# Patient Record
Sex: MALE | Race: BLACK OR AFRICAN AMERICAN | NOT HISPANIC OR LATINO | Employment: FULL TIME | URBAN - METROPOLITAN AREA
[De-identification: names, ages, dates, MRNs, and addresses within clinical notes are randomized per-mention and may not be internally consistent; named-entity substitution may affect disease eponyms.]

---

## 2018-05-03 ENCOUNTER — COMMUNICATION - HEALTHEAST (OUTPATIENT)
Dept: FAMILY MEDICINE | Facility: CLINIC | Age: 43
End: 2018-05-03

## 2018-05-03 ENCOUNTER — OFFICE VISIT - HEALTHEAST (OUTPATIENT)
Dept: FAMILY MEDICINE | Facility: CLINIC | Age: 43
End: 2018-05-03

## 2018-05-03 DIAGNOSIS — I10 ESSENTIAL HYPERTENSION: ICD-10-CM

## 2018-05-03 DIAGNOSIS — M54.6 ACUTE RIGHT-SIDED THORACIC BACK PAIN: ICD-10-CM

## 2018-05-03 DIAGNOSIS — Z00.00 HEALTHCARE MAINTENANCE: ICD-10-CM

## 2018-05-03 DIAGNOSIS — M25.551 RIGHT HIP PAIN: ICD-10-CM

## 2018-05-03 LAB
ALBUMIN SERPL-MCNC: 3.9 G/DL (ref 3.5–5)
ALP SERPL-CCNC: 78 U/L (ref 45–120)
ALT SERPL W P-5'-P-CCNC: 13 U/L (ref 0–45)
ANION GAP SERPL CALCULATED.3IONS-SCNC: 6 MMOL/L (ref 5–18)
AST SERPL W P-5'-P-CCNC: 15 U/L (ref 0–40)
BILIRUB SERPL-MCNC: 0.3 MG/DL (ref 0–1)
BUN SERPL-MCNC: 12 MG/DL (ref 8–22)
CALCIUM SERPL-MCNC: 9.8 MG/DL (ref 8.5–10.5)
CHLORIDE BLD-SCNC: 105 MMOL/L (ref 98–107)
CHOLEST SERPL-MCNC: 213 MG/DL
CO2 SERPL-SCNC: 28 MMOL/L (ref 22–31)
CREAT SERPL-MCNC: 0.96 MG/DL (ref 0.7–1.3)
FASTING STATUS PATIENT QL REPORTED: YES
GFR SERPL CREATININE-BSD FRML MDRD: >60 ML/MIN/1.73M2
GLUCOSE BLD-MCNC: 90 MG/DL (ref 70–125)
HDLC SERPL-MCNC: 45 MG/DL
LDLC SERPL CALC-MCNC: 143 MG/DL
POTASSIUM BLD-SCNC: 4.2 MMOL/L (ref 3.5–5)
PROT SERPL-MCNC: 7.7 G/DL (ref 6–8)
SODIUM SERPL-SCNC: 139 MMOL/L (ref 136–145)
TRIGL SERPL-MCNC: 123 MG/DL

## 2018-05-03 ASSESSMENT — MIFFLIN-ST. JEOR: SCORE: 2207.14

## 2018-05-14 ENCOUNTER — COMMUNICATION - HEALTHEAST (OUTPATIENT)
Dept: FAMILY MEDICINE | Facility: CLINIC | Age: 43
End: 2018-05-14

## 2018-05-16 ENCOUNTER — OFFICE VISIT - HEALTHEAST (OUTPATIENT)
Dept: FAMILY MEDICINE | Facility: CLINIC | Age: 43
End: 2018-05-16

## 2018-05-16 DIAGNOSIS — I10 ESSENTIAL HYPERTENSION: ICD-10-CM

## 2018-05-16 DIAGNOSIS — M54.50 ACUTE RIGHT-SIDED LOW BACK PAIN WITHOUT SCIATICA: ICD-10-CM

## 2018-05-16 ASSESSMENT — MIFFLIN-ST. JEOR: SCORE: 2202.61

## 2018-05-30 ENCOUNTER — COMMUNICATION - HEALTHEAST (OUTPATIENT)
Dept: FAMILY MEDICINE | Facility: CLINIC | Age: 43
End: 2018-05-30

## 2018-05-30 DIAGNOSIS — I10 ESSENTIAL HYPERTENSION: ICD-10-CM

## 2018-07-23 ENCOUNTER — RECORDS - HEALTHEAST (OUTPATIENT)
Dept: GENERAL RADIOLOGY | Facility: CLINIC | Age: 43
End: 2018-07-23

## 2018-07-23 ENCOUNTER — OFFICE VISIT - HEALTHEAST (OUTPATIENT)
Dept: FAMILY MEDICINE | Facility: CLINIC | Age: 43
End: 2018-07-23

## 2018-07-23 DIAGNOSIS — M54.6 PAIN IN THORACIC SPINE: ICD-10-CM

## 2018-07-23 DIAGNOSIS — G89.29 OTHER CHRONIC PAIN: ICD-10-CM

## 2018-07-23 DIAGNOSIS — G89.29 CHRONIC MIDLINE THORACIC BACK PAIN: ICD-10-CM

## 2018-07-23 DIAGNOSIS — M54.6 CHRONIC MIDLINE THORACIC BACK PAIN: ICD-10-CM

## 2018-07-23 ASSESSMENT — MIFFLIN-ST. JEOR: SCORE: 2189

## 2018-07-25 ENCOUNTER — COMMUNICATION - HEALTHEAST (OUTPATIENT)
Dept: FAMILY MEDICINE | Facility: CLINIC | Age: 43
End: 2018-07-25

## 2019-06-08 ENCOUNTER — COMMUNICATION - HEALTHEAST (OUTPATIENT)
Dept: FAMILY MEDICINE | Facility: CLINIC | Age: 44
End: 2019-06-08

## 2019-06-08 DIAGNOSIS — I10 ESSENTIAL HYPERTENSION: ICD-10-CM

## 2019-10-16 ENCOUNTER — COMMUNICATION - HEALTHEAST (OUTPATIENT)
Dept: FAMILY MEDICINE | Facility: CLINIC | Age: 44
End: 2019-10-16

## 2019-10-16 DIAGNOSIS — I10 ESSENTIAL HYPERTENSION: ICD-10-CM

## 2020-04-22 ENCOUNTER — COMMUNICATION - HEALTHEAST (OUTPATIENT)
Dept: FAMILY MEDICINE | Facility: CLINIC | Age: 45
End: 2020-04-22

## 2020-04-27 ENCOUNTER — COMMUNICATION - HEALTHEAST (OUTPATIENT)
Dept: FAMILY MEDICINE | Facility: CLINIC | Age: 45
End: 2020-04-27

## 2020-04-27 ENCOUNTER — AMBULATORY - HEALTHEAST (OUTPATIENT)
Dept: FAMILY MEDICINE | Facility: CLINIC | Age: 45
End: 2020-04-27

## 2020-04-27 ENCOUNTER — OFFICE VISIT - HEALTHEAST (OUTPATIENT)
Dept: FAMILY MEDICINE | Facility: CLINIC | Age: 45
End: 2020-04-27

## 2020-04-27 DIAGNOSIS — R07.89 CHEST WALL PAIN: ICD-10-CM

## 2020-04-27 DIAGNOSIS — M54.6 CHRONIC RIGHT-SIDED THORACIC BACK PAIN: ICD-10-CM

## 2020-04-27 DIAGNOSIS — R53.83 FATIGUE, UNSPECIFIED TYPE: ICD-10-CM

## 2020-04-27 DIAGNOSIS — G89.29 CHRONIC RIGHT-SIDED THORACIC BACK PAIN: ICD-10-CM

## 2020-04-27 DIAGNOSIS — R06.83 SNORING: ICD-10-CM

## 2020-04-27 RX ORDER — IBUPROFEN 200 MG
200 TABLET ORAL EVERY 6 HOURS PRN
Status: SHIPPED | COMMUNITY
Start: 2020-04-27

## 2020-04-29 ENCOUNTER — COMMUNICATION - HEALTHEAST (OUTPATIENT)
Dept: FAMILY MEDICINE | Facility: CLINIC | Age: 45
End: 2020-04-29

## 2020-05-15 ENCOUNTER — HOSPITAL ENCOUNTER (OUTPATIENT)
Dept: PHYSICAL MEDICINE AND REHAB | Facility: CLINIC | Age: 45
Discharge: HOME | End: 2020-05-15
Attending: PAIN MEDICINE

## 2020-05-15 DIAGNOSIS — M54.6 ACUTE LEFT-SIDED THORACIC BACK PAIN: ICD-10-CM

## 2020-05-15 DIAGNOSIS — M79.18 MYOFASCIAL PAIN: ICD-10-CM

## 2020-05-26 ENCOUNTER — OFFICE VISIT - HEALTHEAST (OUTPATIENT)
Dept: PHYSICAL THERAPY | Facility: REHABILITATION | Age: 45
End: 2020-05-26

## 2020-05-26 DIAGNOSIS — R29.3 POOR POSTURE: ICD-10-CM

## 2020-05-26 DIAGNOSIS — M62.81 MUSCLE WEAKNESS (GENERALIZED): ICD-10-CM

## 2020-05-26 DIAGNOSIS — M54.6 PAIN IN THORACIC SPINE: ICD-10-CM

## 2020-06-01 ENCOUNTER — OFFICE VISIT - HEALTHEAST (OUTPATIENT)
Dept: PHYSICAL THERAPY | Facility: REHABILITATION | Age: 45
End: 2020-06-01

## 2020-06-01 DIAGNOSIS — M54.6 PAIN IN THORACIC SPINE: ICD-10-CM

## 2020-06-01 DIAGNOSIS — M62.81 MUSCLE WEAKNESS (GENERALIZED): ICD-10-CM

## 2020-06-01 DIAGNOSIS — R29.3 POOR POSTURE: ICD-10-CM

## 2020-06-15 ENCOUNTER — HOSPITAL ENCOUNTER (OUTPATIENT)
Dept: PHYSICAL MEDICINE AND REHAB | Facility: CLINIC | Age: 45
Discharge: HOME | End: 2020-06-15
Attending: PAIN MEDICINE

## 2020-06-15 DIAGNOSIS — M79.18 MYOFASCIAL PAIN: ICD-10-CM

## 2020-06-15 DIAGNOSIS — M54.6 ACUTE RIGHT-SIDED THORACIC BACK PAIN: ICD-10-CM

## 2020-12-08 ENCOUNTER — COMMUNICATION - HEALTHEAST (OUTPATIENT)
Dept: FAMILY MEDICINE | Facility: CLINIC | Age: 45
End: 2020-12-08

## 2020-12-08 DIAGNOSIS — I10 ESSENTIAL HYPERTENSION: ICD-10-CM

## 2021-04-15 ENCOUNTER — OFFICE VISIT - HEALTHEAST (OUTPATIENT)
Dept: FAMILY MEDICINE | Facility: CLINIC | Age: 46
End: 2021-04-15

## 2021-04-15 DIAGNOSIS — I10 ESSENTIAL HYPERTENSION: ICD-10-CM

## 2021-04-15 RX ORDER — LISINOPRIL 10 MG/1
10 TABLET ORAL DAILY
Qty: 90 TABLET | Refills: 3 | Status: SHIPPED | OUTPATIENT
Start: 2021-04-15 | End: 2022-05-02

## 2021-05-25 ENCOUNTER — RECORDS - HEALTHEAST (OUTPATIENT)
Dept: ADMINISTRATIVE | Facility: CLINIC | Age: 46
End: 2021-05-25

## 2021-06-01 VITALS — HEIGHT: 72 IN | BODY MASS INDEX: 38.06 KG/M2 | WEIGHT: 281 LBS

## 2021-06-01 VITALS — BODY MASS INDEX: 37.65 KG/M2 | WEIGHT: 278 LBS | HEIGHT: 72 IN

## 2021-06-01 VITALS — WEIGHT: 282 LBS | HEIGHT: 72 IN | BODY MASS INDEX: 38.19 KG/M2

## 2021-06-02 NOTE — TELEPHONE ENCOUNTER
RN cannot approve Refill Request    RN can NOT refill this medication Protocol failed and NO refill given.         Edel Arauz, [facility] Triage/Med Refill 10/17/2019    Requested Prescriptions   Pending Prescriptions Disp Refills     amLODIPine (NORVASC) 10 MG tablet [Pharmacy Med Name: AMLODIPINE BESYLATE 10MG TABLETS] 90 tablet 3     Sig: TAKE 1 TABLET(10 MG) BY MOUTH DAILY       Calcium-Channel Blockers Protocol Failed - 10/16/2019  5:11 PM        Failed - PCP or prescribing provider visit in past 12 months or next 3 months     Last office visit with prescriber/PCP: 7/23/2018 Birgit Trejo MD OR same dept: Visit date not found OR same specialty: 7/23/2018 Birgit Trejo MD  Last physical: Visit date not found Last MTM visit: Visit date not found   Next visit within 3 mo: Visit date not found  Next physical within 3 mo: Visit date not found  Prescriber OR PCP: Birgit Trejo MD  Last diagnosis associated with med order: 1. Essential hypertension  - amLODIPine (NORVASC) 10 MG tablet [Pharmacy Med Name: AMLODIPINE BESYLATE 10MG TABLETS]; TAKE 1 TABLET(10 MG) BY MOUTH DAILY  Dispense: 90 tablet; Refill: 0    If protocol passes may refill for 12 months if within 3 months of last provider visit (or a total of 15 months).             Failed - Blood pressure filed in past 12 months     BP Readings from Last 1 Encounters:   07/23/18 110/82

## 2021-06-04 VITALS — DIASTOLIC BLOOD PRESSURE: 82 MMHG | SYSTOLIC BLOOD PRESSURE: 128 MMHG | BODY MASS INDEX: 37.97 KG/M2 | WEIGHT: 280 LBS

## 2021-06-05 VITALS
BODY MASS INDEX: 38.92 KG/M2 | WEIGHT: 287 LBS | OXYGEN SATURATION: 98 % | SYSTOLIC BLOOD PRESSURE: 128 MMHG | DIASTOLIC BLOOD PRESSURE: 94 MMHG | HEART RATE: 76 BPM

## 2021-06-07 NOTE — PROGRESS NOTES
Patient ID: Juanjo Espinoza is a 44 y.o. male.  /82   Wt (!) 280 lb (127 kg)   BMI 37.97 kg/m      Assessment/Plan:                Diagnoses and all orders for this visit:    Chronic right-sided thoracic back pain    Chest wall pain  -     XR Chest 2 Views  -     Ambulatory referral to Spine Care  -     predniSONE (DELTASONE) 20 MG tablet; Take 20 mg by mouth daily.  Dispense: 7 tablet; Refill: 0    Snoring  -     Ambulatory referral to Sleep Medicine    Fatigue, unspecified type  -     Ambulatory referral to Sleep Medicine      DISCUSSION  The exact etiology of his symptoms is not clear.  It is less likely to be an underlying malignant process such as tumor infection or other similar consideration.  Likely possibilities would include muscle injury, rib injury or neuropathic pain.    Try course of more potent anti-inflammatories with prednisone.    Referral to spine clinic for additional evaluation and to formulate further treatment plan.    He is to be cautious at work but did not feel a work restriction was imperative at this point in time but may become necessary at some point.    Referral to sleep specialist for fatigue with high probability of underlying sleep apnea.  Subjective:     HPI    Juanjo Espinoza is a 44 y.o. male is here today to discuss pain under the right shoulder blade.  Patient reports the pain is been present for approximately 1 year.  Over the past week the pain has gotten significantly worse.  There is no history of any injury.  Review of chart indicates as far back as 2018 patient may have been dealing with pain in similar area.  He had an x-ray performed at that time which was unrevealing.  Patient states normally he is able to take ibuprofen and it helps with the pain but lately he has not.  He had a phone visit arranged last week but he had to cancel it because of uncertainties about insurance coverage.  Patient states he accessed a telephone consultation service and was  prescribed prednisone.  No record is available at this visit and patient states it is uncertain what the exact diagnosis was.  Patient states that he did not fill the prednisone.  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  Patient reports exacerbation of pain at his workplace doing certain tasks.  Patient reports specific movements tend to exacerbate pain.  Sometimes sitting too long can exacerbate pain.  Pain can be quite severe and sometimes lasts the whole day.  Pain is not worsened by cough sneeze or deep inspiration.  He reports no breathing difficulty.  In addition to these described pain symptoms he does report ongoing fatigue.  Upon further inquiry he does snore and is actually has had witnessed apneic spells while sleeping.  He seems to have chronic progressive fatigue.  He has never been evaluated for sleep apnea.  He otherwise denies changes in appetite or unexpected changes in his weight.  He denies chest pain shortness of breath or other signs of illness including fever.    Review of Systems  Complete review of systems is obtained.  Other than the specific considerations noted above complete review of systems is negative.      Objective:   Medications:  Current Outpatient Medications   Medication Sig     amLODIPine (NORVASC) 10 MG tablet TAKE 1 TABLET(10 MG) BY MOUTH DAILY     ibuprofen (ADVIL,MOTRIN) 200 MG tablet Take 200 mg by mouth every 6 (six) hours as needed for pain.     predniSONE (DELTASONE) 20 MG tablet Take 20 mg by mouth daily.     Allergies:  No Known Allergies    Tobacco:   reports that he has been smoking. He has been smoking about 0.50 packs per day. He has never used smokeless tobacco.     Physical Exam      /82   Wt (!) 280 lb (127 kg)   BMI 37.97 kg/m      General: Patient alert no signs of distress    Lungs: Good air movement throughout no wheeze or crackle, no exacerbation of pain with deep inspiration.    Heart: Regular rate and rhythm no  murmur    Examination of his back and chest wall reveals no bruising redness swelling or bony abnormalities are otherwise structural abnormalities that are visual in nature.  Palpation of the area as described as being greater source of pain in the chest wall just inferior to the lower border of the scapula does not elicit any significant pain.  Patient does report pain with specific movements of the arm especially with cross body abduction.  No difficulty with movement at the trunk.  Twisting movements of the trunk do cause worsening pain when going from a rotated position back to the midline.  Patient also reports doing this maneuver he has pain that also occurs near the right side sternal border at about the same level as his pain in the back.  No skin rashes seen anywhere on the chest.    CXR: The chest x-ray does not show any pathology in the lungs.  I am unable to see any distinct abnormality regarding any of the ribs or the visualized area of the spine that would in any way be related to his symptoms.

## 2021-06-07 NOTE — TELEPHONE ENCOUNTER
Called patient to start phone visit with   Dr oBb, patient was unaware that he would be charged for this visit, he asked multiple questions including what his exact cost would be. Patient decided to cancel apt with us and was going to call a different place that he has called in the past that charges a flat fee of $40. 00 for phone visits, if they are unable to help him he will call his insurance to see what his coverage will be and call us back to reschedule.

## 2021-06-07 NOTE — TELEPHONE ENCOUNTER
New Appointment Needed  What is the reason for the visit:    Office visit, patient would like to be seen in clinic. He already completed an online visit, per patient. His back pain is not improving & he will need paperwork filled out for his work.  Provider Preference: Any available  How soon do you need to be seen?: today or tomorrow  Waitlist offered?: No  Okay to leave a detailed message:  Yes

## 2021-06-07 NOTE — TELEPHONE ENCOUNTER
Finally got Eliezer.    He missed the following days: April 20th thru May 1rst.    He has not been working any reduced hours.    He does not need to work any reduced hours in the future as of now.    He was wondering if he can pick this up on Thursday.     I said I can have it up there by then.

## 2021-06-07 NOTE — TELEPHONE ENCOUNTER
Forms Request  Name of form/paperwork: [facility]  Have you been seen for this request: N/A  Do we have the form: Yes- Dropped off in clinic 4/28/2020  When is form needed by: No later than 5/1/2020  How would you like the form returned:   Patient Notified form requests are processed in 3-5 business days: No  Okay to leave a detailed message? Yes  [phone]    FYI: Patient stated that he dropped off [facility] paperwork yesterday in clinic. Patient stated that he would like a call back to confirm Dr. Sutherland has the paperwork.

## 2021-06-07 NOTE — TELEPHONE ENCOUNTER
Called  LM    Dr tony would like to chat with him about his forms.    If he calls back can we add him to DR Tony's scheduled today/Thursday at 4pm for a telephone visit?    If that doesn't work, we can scheduled him as a telephone visit tmw to complete the form.    Thank you

## 2021-06-08 NOTE — PROGRESS NOTES
"Juanjo Espinoza is a 45 y.o. male who is being evaluated via a billable video visit.      The patient has been notified of following:     \"This video visit will be conducted via a call between you and your physician/provider. We have found that certain health care needs can be provided without the need for an in-person physical exam.  This service lets us provide the care you need with a video conversation.  If a prescription is necessary we can send it directly to your pharmacy.  If lab work is needed we can place an order for that and you can then stop by our lab to have the test done at a later time.    Video visits are billed at different rates depending on your insurance coverage. Please reach out to your insurance provider with any questions.    If during the course of the call the physician/provider feels a video visit is not appropriate, you will not be charged for this service.\"    Patient has given verbal consent to a Video visit? Yes    Patient would like to receive their AVS by AVS Preference: Malena.    Patient would like the video invitation sent by: Text to cell phone: [phone]    Will anyone else be joining your video visit? No        Video Start Time: 14:10    Additional provider notes:   ASSESSMENT: Juanjo Espinoza is a 45 y.o. male presents for consultation at the request of HE PCP Alecia Ceja MD, with past medical history significant for psoriasis, hypertension, acute right-sided low back pain without sciatica, who presents today for new patient evaluation of right-sided thoracic pain and rib cage area pain:     -On video visit physical exam pain is in the area of the thoracic spine wrapping around the rib cage area on the right and to the chest wall.  It appears that he has full strength in upper and lower extremities.  His pain is likely myofascial in nature, however does have some radicular type features.    Patient is neurologically intact on exam. No myelopathic or red flag " symptoms.    CARLITO Score: 36    WHO 5: 16     Diagnoses and all orders for this visit:    Acute left-sided thoracic back pain  -     meloxicam (MOBIC) 15 MG tablet; Take 1 tablet (15 mg total) by mouth daily.  Dispense: 30 tablet; Refill: 1  -     gabapentin (NEURONTIN) 300 MG capsule; Take 1 capsule (300 mg total) by mouth 3 (three) times a day. Increase to 3 tablets three times a day as instructed.  Dispense: 270 capsule; Refill: 1  -     Ambulatory referral to Physical Therapy    Myofascial pain  -     meloxicam (MOBIC) 15 MG tablet; Take 1 tablet (15 mg total) by mouth daily.  Dispense: 30 tablet; Refill: 1  -     Ambulatory referral to Physical Therapy       PLAN:  Reviewed spine anatomy and disease process. Discussed diagnosis and treatment options with the patient today. A shared decision making model was used. The patient's values and choices were respected. The following represents what was discussed and decided upon by the provider and the patient.     -DIAGNOSTIC TESTS:  Images were personally reviewed and interpreted and explained to patient today using spine model.   --X-ray of the thoracic spine dated 7/23/2018 is personally viewed and images and interpreted it does show mild degenerative interspace narrowing and endplate spurring.  --We will consider MRI of thoracic spine should pain continue despite conservative management.    -PHYSICAL THERAPY: Ordered physical therapy and would like him to have a home-going exercise program that he can do on a daily basis.  Discussed the importance of core strengthening, ROM, stretching exercises with the patient and how each of these entities is important in decreasing pain.  Explained to the patient that the purpose of physical therapy is to teach the patient a home exercise program.  These exercises need to be performed every day in order to decrease pain and prevent future occurrences of pain.  Likened it to brushing one's teeth.      -MEDICATIONS: I have ordered  gabapentin 300 mg and given him a chart of how I want him to increase this medication.  I have also written for meloxicam 15 mg that he can either take once daily or split in half and take twice daily with food.  He should not take ibuprofen with this.  He should wait until he is done with his prednisone before starting this which will be in a few days.  -  Discussed multiple medication options today with patient. Discussed risks, side effects, and proper use of medications. Patient verbalized understanding.    -INTERVENTIONS: No interventions at this time.  Discussed risks and benefits of injections with patient today.    -PATIENT EDUCATION: We discussed pain management in a multiple fashion including physical therapy, location management, possible future imaging and/or injections.    -FOLLOW-UP:   Patient will follow-up in 4 weeks via video visit.    Advised patient to call the [facility] if symptoms worsen or you have problems controlling bladder and bowel function.   ______________________________________________________________________    SUBJECTIVE:   Juanjo Espinoza  is a 45 y.o. male who presents today for new patient evaluation of right-sided mid thoracic pain wrapping around to the anterior chest wall and some paresthesias in his right shoulder area.  Patient reports that for the last 4 weeks pain has been very severe especially when he is laying on the right side.  Pain is somewhat improved with 50 mg of prednisone that he was prescribed.  He is also been prescribed 20 mg of prednisone which was not helpful after having stopped it.  He is never had physical therapy for this.  In the past he has had this pain and had a fairly normal x-ray in 2018.  He is never tried heat or ice for this.  His pain today is 9/10.  His pain is worse at night and when he is trying to get out of bed.  Pain is especially worse if he lays on his right side.  Pain is improved with getting up and moving around as well as  medications as described above.  He denies any bowel or bladder changes, fevers, chills, unintentional weight loss.    -Treatment to Date: X-ray of the thoracic spine dated 7/23/2018.  X-ray of the chest dated 4/27/2020.    -Medications:    Current Outpatient Medications on File Prior to Encounter   Medication Sig Dispense Refill     amLODIPine (NORVASC) 10 MG tablet TAKE 1 TABLET(10 MG) BY MOUTH DAILY 90 tablet 3     ibuprofen (ADVIL,MOTRIN) 200 MG tablet Take 200 mg by mouth every 6 (six) hours as needed for pain.       predniSONE (DELTASONE) 20 MG tablet Take 20 mg by mouth daily. 7 tablet 0     No current facility-administered medications on file prior to encounter.        No Known Allergies    Past medical history hypertension, hyperlipidemia, serious infection as a child, acid reflux    Patient Active Problem List   Diagnosis     Psoriasis     Essential hypertension     Acute right-sided low back pain without sciatica       Past Surgical History:   Procedure Laterality Date     HI APPENDECTOMY      Description: Appendectomy;  Recorded: 02/22/2013;     Family history: Family history of cancer, diabetes, heart disease, hypertension.    Reviewed past medical, surgical, and family history with patient found on new patient intake packet located in EMR Media tab.     SOCIAL HX: Patient report that he smokes cigarettes as well as marijuana.  He drinks alcohol occasionally.    ROS: Specifically negative for bowel/bladder dysfunction, balance changes, headache, dizziness, foot drop, fevers, chills, appetite changes, nausea/vomiting, unexplained weight loss. Otherwise 13 systems reviewed are negative. Please see the patient's intake questionnaire from today for details.    OBJECTIVE:  There were no vitals taken for this visit.    PHYSICAL EXAMINATION:  --CONSTITUTIONAL: Vital signs as above. No acute distress. The patient is well nourished and well groomed.  --PSYCHIATRIC: The patient is awake, alert, oriented to  person, place, time and answering questions appropriately with clear speech. Appropriate mood and affect   --HEENT: Sclera are non-injected.   --SKIN: Skin over the face, bilateral upper extremities, and posterior torso is clean, dry, intact without rashes.  --LYMPH NODES: No palpable or tender anterior/posterior cervical, submandibular, or supraclavicular lymph nodes.    --RESPIRATORY: Normal rhythm and effort. No abnormal accessory muscle breathing patterns noted.   --GROSS MOTOR: Easily arises from a seated position. Toe walking and heel walking are normal.    --CERVICAL SPINE: Inspection reveals no evidence of deformity. Range of motion is mildly limited in cervical flexion, extension, lateral rotation.  He points to his right intrascapular area and right rib cage into the chest wall where his pain is.  --SHOULDERS: Full range of motion bilaterally. Negative empty can.  --UPPER EXTREMITY MOTOR TESTING:  Upper extremity strength appears to be full and is at least antigravity.    RESULTS: Prior medical records from [facility] primary care provider were reviewed today.     Imaging: Thoracic spine Imaging was personally reviewed and interpreted today. The images were shown to the patient and the findings were explained using a spine model.    Video-Visit Details    Type of service:  Video Visit    Video End Time (time video stopped): 14:26  Originating Location (pt. Location): Home    Distant Location (provider location):  [facility]     Platform used for Video Visit: Christina Hall DO

## 2021-06-08 NOTE — PATIENT INSTRUCTIONS - HE
Prescribed Gabapentin today, 300 mg tablets, to be titrated up to 3 tablets 3 times a day as tolerated for your nerve pain. Please follow Gabapentin dosing chart below.    Gabapentin 300mg Dosing Chart    DATE  MORNING AFTERNOON BEDTIME    Day 1 0 0 1    Day 2 0 0 1    Day 3 0 0 1    Day 4 1 0 1    Day 5 1 0 1    Day 6 1 0 1    Day 7 1 1 1    Day 8 1 1 1    Day 9 1 1 1    Day 10 1 1 2    Day 11 1 1 2    Day 12 1 1 2    Day 13 2 1 2    Day 14 2 1 2    Day 15 2 1 2    Day 16 2 2 2    Day 17 2 2 2    Day 18 2 2 2    Day 19 2 2 3    Day 20 2 2 3    Day 21 2 2 3    Day 22 3 2 3    Day 23 3 2 3    Day 24 3 2 3    Day 25 3 3 3    Day 26 3 3 3    Day 27 3 3 3     Continue medication, taking 3 capsules three times daily    Please call if you have any questions regarding how to take your medication  Clinic Phone # [phone]        Discussed the importance of core strengthening, ROM, stretching exercises with the patient and how each of these entities is important in decreasing pain.  Explained to the patient that the purpose of physical therapy is to teach the patient a home exercise program.  These exercises need to be performed every day in order to decrease pain and prevent future occurrences of pain.        I have ordered meloxicam 15 mg that you can either take once daily or split in half and takes twice daily.  Please do not take this until you are done with your prednisone.  Please not take it with ibuprofen.  Please take with food.    ~Please call Nurse Navigation line [phone] with any questions or concerns about your treatment plan, if symptoms worsen and you would like to be seen urgently, or if you have problems controlling bladder and bowel function.

## 2021-06-08 NOTE — PROGRESS NOTES
[facility]   Lumbo-Pelvic Initial Evaluation    Patient Name: Juanjo Espinoza  Date of evaluation: 5/27/2020  Referral Diagnosis: Acute left-sided thoracic back pain  Referring provider: Don Hall*  Visit Diagnosis:     ICD-10-CM    1. Pain in thoracic spine  M54.6    2. Poor posture  R29.3    3. Muscle weakness (generalized)  M62.81        Assessment:      Juanjo Espinoza is a 45 y.o. male who presents to therapy today with chief complaints of right sided thoracic pain under the shoulder blade area that started 4-5 weeks ago. Onset date of sx was sudden sharp pain when turning to get out of bed one more.  Pt reported h/o midline thoracic pain in the past was was easily managed and then went away.  Pain symptoms are sharp pain with certain movements, activities and positions, usually comes with the first movement getting out of bed. Patient sleep on his stomach with head turned to right and arms over head, when getting out of bend the patient will turn head and trunk to the left and slide the right arm under him to push up to sitting. On exam the only movement that triggered his pain was trunk rotation to the left.  Functional impairments include work, reaching, twisting and turning the trunk..  Pt will benefit from skilled therapy to increase joint mobility, increase ROM and improve functional mobility and return to work without restrictions.   .   Impairments in  pain, posture, ROM, joint mobility, strength  The POC is dynamic and will be modified on an ongoing basis.  Barriers to achieving goals as noted in the assessment section may affect outcome.  Prognosis to achieve goals is  good   Pt. is appropriate for skilled PT intervention as outlined in the Plan of Care (POC).  Pt. is a good candidate for skilled PT services to improve pain levels and function.    Goals:  Pt. will demonstrate/verbalize independence in self-management of condition in : 6 weeks  Pt. will be  independent with home exercise program in : 6 weeks  Patient will twist/turn : in bed;while standing;while sitting;with less pain;with less difficulty;for bed mobilitiy;at work;in 6 weeks  Patient will transfer: supine/sit;for in/out of bed;with less pain;in 6 weeks  Patient will work: with improved ability to work without restrictions;in 6 weeks    No data recorded    Patient's expectations/goals are realistic.    Barriers to Learning or Achieving Goals:  No Barriers.       Plan / Patient Instructions:        Plan of Care:   Authorization / Certification Start Date: 05/26/20  Communication with: Referral Source  Patient Related Instruction: Nature of Condition;Treatment plan and rationale;Self Care instruction;Basis of treatment;Body mechanics;Posture;Precautions;Next steps;Expected outcome  Times per Week: 1  Number of Weeks: 10-12  Number of Visits: 10  Therapeutic Exercise: ROM;Stretching;Strengthening  Neuromuscular Reeducation: kinesio tape;posture;core  Manual Therapy: soft tissue mobilization;myofascial release;joint mobilization;muscle energy  Modalities: electrical stimulation;ultrasound;cold pack;hot pack      POC and pathology of condition were reviewed with patient.  Pt. is in agreement with the Plan of Care  A Home Exercise Program (HEP) was initiated today.  Pt. was instructed in exercises by PT and patient was given a handout with detailed instructions.  Treatment techniques, plan of care, and goals were discussed with the patient.  The patient agrees to the plan as outlined.  The plan of care is dynamic and will be modified on an ongoing basis.    Plan for next visit: manual thoracic joint mobilizations as indicated, progress trunk ROM, improve posture and advance exercise for back and scapular stabilizations exercises      Subjective:       Patient reports the pain started about 4-5 weeks ago without a specific injury. Patient reports he woke up and when he tried to get up out of bed there was a  sharp pain on the right side. The patient reports the pain was there with certain movements, twisting, stretching reaching and standing. It took more then a week to see the MD, was prescribed predisone and when he took it the pain was better x 2 days then it came again in the AM again and then the medication helped again. This pain is coming and going in the AM and patient states the pain intesenty will be there but does not last as long now.   Patient sleeps on his stomache with head turned to the right side.   Hx of pain on the thoracic in 2018 and had xrays then, pain was similar to this but ibuprofen helped this pain and eventually went away.   Works a physical maintenance work.     Social information:   Living Situation:single family home   Occupation:physical     Work Status:Working full time   Equipment Available: None    Pain Ratin  Pain rating at best: 0  Pain rating at worst: 10  Pain description: aching, burning and sharp    Functional limitations are described as occurring with:   reaching at shoulder height, overhead and behind back  repetitive movements  performing routine daily activities  transitional movements getting out of  bed  twisting or turning trunk    Patient reports benefit from:  anti-inflammatory       Objective:      Note: Items left blank indicates the item was not performed or not indicated at the time of the evaluation.    Patient Outcome Measures :    Modified Oswestry Low Back Pain Disablity Questionnaire  in %: 20     Scores range from 0-100%, where a score of 0% represents minimal pain and maximal function. The minimal clinically important difference is a score reduction of 12%.    Examination  1. Pain in thoracic spine     2. Poor posture     3. Muscle weakness (generalized)       Involved side: Right  Posture Observation:      Shoulder/Thoracic complex: Mild bilateral scapular protraction   Mildly increased upper thoracic kyphosis    Lumbar ROM:    Date:  5/27/2020     *Indicate scale AROM AROM AROM   Lumbar Flexion To ankles, no pain      Lumbar Extension WNL       Right Left Right Left Right Left   Lumbar Sidebending WNL WNL       Lumbar Rotation WNL Min dec mild pain        Thoracic Flexion WNL     Thoracic Extension Min      Thoracic Sidebending WNL WNL       Thoracic Rotation WNL Mod dec pain on R       Right shoulder ROM WNL and no pain in all motions     Upper extremity MMT - all WNL and no pain     Lower Extremity Strength:   5/27/2020 - all WNL and no pain       Sensation    WNL to light touch in B arms and legs  Reflex Testing - not tested, not indicated today       Palpation: mild tenderness to the right facet joints on mid thoracic spine, hypomobile to PAs    Lumbar Special Tests:     Lumbar Special Tests Right Left SI Tests Right  Left   Quadrant test   SI Compression     Straight leg raise - - SI Distraction     Crossover response   POSH Test     Slump - - Sacral Thrust     Sit-up test  FADIR     Trunk extensor endurance test  Resisted Abduction - -   Prone instability test  Other:     Pubic shotgun  Other:       Repeated Motion Testing:  Does not centralize  Does not peripheralize    Passive Mobility - Joint Integrity:  Hypomobile      Treatment Today     TREATMENT MINUTES COMMENTS   Evaluation 30  -Patient educated on pathology  -Discussed POC   Self-care/ Home management     Manual therapy 13  Prone PAs to the mid thoracic spine grade III-IV, unilateral PAs to the facet joints on right side of mid thoracic grade III   Neuromuscular Re-education     Therapeutic Activity     Therapeutic Exercises 12  -Demo/performance of HEP  Exercise #1: supine towel roll pec stretching 1 min x 3 reps  Comment #1: SL reach and rolls with education to no push into the pain x 15 reps each side  Exercise #2: seated L3 band resisted trunk rotation to right only x 15 reps     Gait training     Modality__________________                Total 55     Blank areas are  intentional and mean the treatment did not include these items.     PT Evaluation Code: (Please list factors)  Patient History/Comorbidities: as above   Examination: as above   Clinical Presentation: stable   Clinical Decision Making: low     Patient History/  Comorbidities Examination  (body structures and functions, activity limitations, and/or participation restrictions) Clinical Presentation Clinical Decision Making (Complexity)   No documented Comorbidities or personal factors 1-2 Elements Stable and/or uncomplicated Low   1-2 documented comorbidities or personal factor 3 Elements Evolving clinical presentation with changing characteristics Moderate   3-4 documented comorbidities or personal factors 4 or more Unstable and unpredictable High     Madonna Kirk, PT, DPT, CLT  5/27/2020  1:43 PM

## 2021-06-08 NOTE — PROGRESS NOTES
[facility] Rehabilitation Daily Progress     Patient Name: Juanjo Espinoza  Date: 6/1/2020  Visit #: 2  PTA visit #:    Referral Diagnosis: Acute left-sided thoracic back pain  Referring provider: Don Hall*  Visit Diagnosis:     ICD-10-CM    1. Pain in thoracic spine  M54.6    2. Poor posture  R29.3    3. Muscle weakness (generalized)  M62.81        Initial Eval Assessment:   Juanjo Espinoza is a 45 y.o. male who presents to therapy today with chief complaints of right sided thoracic pain under the shoulder blade area that started 4-5 weeks ago. Onset date of sx was sudden sharp pain when turning to get out of bed one more.  Pt reported h/o midline thoracic pain in the past was was easily managed and then went away.  Pain symptoms are sharp pain with certain movements, activities and positions, usually comes with the first movement getting out of bed. Patient sleep on his stomach with head turned to right and arms over head, when getting out of bend the patient will turn head and trunk to the left and slide the right arm under him to push up to sitting. On exam the only movement that triggered his pain was trunk rotation to the left.  Functional impairments include work, reaching, twisting and turning the trunk..  Pt will benefit from skilled therapy to increase joint mobility, increase ROM and improve functional mobility and return to work without restrictions.     No data recorded    Assessment:   Patient is no longer having pain in the back. With performance of HEP patient has not had pain for about a week. Has been able to perform daily life without limitation. Unsure if result is from medication or exercise/manual therapy. Time spent on progression of scapular strengthening and progression of exercise. Will keep patients chart open as he decreases medication to ensure no pain present.  HEP/POC compliance is  good .  Patient is benefitting from skilled physical therapy and is making steady  progress toward functional goals.  Patient is appropriate to continue with skilled physical therapy intervention, as indicated by initial plan of care.    Goal Status:  Pt. will demonstrate/verbalize independence in self-management of condition in : 6 weeks  Pt. will be independent with home exercise program in : 6 weeks  Patient will twist/turn : in bed;while standing;while sitting;with less pain;with less difficulty;for bed mobilitiy;at work;in 6 weeks;Met  Patient will transfer: Met;supine/sit;for in/out of bed;with less pain;in 6 weeks  Patient will work: with improved ability to work without restrictions;in 6 weeks    No data recorded    Plan / Patient Education:     Continue with initial plan of care.  Progress with home program as tolerated.  Plan for next visit: manual thoracic joint mobilizations as indicated, progress trunk ROM, improve posture and advance exercise for back and scapular stabilizations exercises   Subjective:     Pain Ratin    Patient reports that he is doing excellent. Has not had any pain. No longer having pain getting in and out of bed. No twisting causes pain, no certain jolts.     He is taking gabapentin and melaxaprin.     Objective:     No pain  No pain with trunk rotation  No pain with palpation    Exercise #1: supine towel roll pec stretching 1 min x 3 reps  Comment #1: SL reach and rolls with education to no push into the pain x 15 reps each side  Exercise #2: seated L3 band resisted trunk rotation to right only x 15 reps, progressed to L4  Comment #2: Lat pull down strengthening x15, L4 band  Exercise #3: Scap retraction rows x15, L4 band  Pec stretch in doorway x30sec alee in session, given as option for at home      Treatment Today     TREATMENT MINUTES COMMENTS   Evaluation     Self-care/ Home management     Manual therapy     Neuromuscular Re-education     Therapeutic Activity     Therapeutic Exercises 25 Took objective measures. Reviewed and progressed HEP. Added scapular  strengthening   Gait training     Modality__________________                Total 25    Blank areas are intentional and mean the treatment did not include these items.       Kita Berry, PT, DPT  6/1/2020

## 2021-06-08 NOTE — PROGRESS NOTES
"Juanjo Espinoza is a 45 y.o. male who is being evaluated via a billable video visit.      The patient has been notified of following:     \"This video visit will be conducted via a call between you and your physician/provider. We have found that certain health care needs can be provided without the need for an in-person physical exam.  This service lets us provide the care you need with a video conversation.  If a prescription is necessary we can send it directly to your pharmacy.  If lab work is needed we can place an order for that and you can then stop by our lab to have the test done at a later time.    Video visits are billed at different rates depending on your insurance coverage. Please reach out to your insurance provider with any questions.    If during the course of the call the physician/provider feels a video visit is not appropriate, you will not be charged for this service.\"    Patient has given verbal consent to a Video visit? Yes    How would you like to obtain your AVS? AVS Preference: Mail a copy.  Patient would like the video invitation sent by: Text to cell phone: [phone]    Will anyone else be joining your video visit? No        Video Start Time: 3:21 PM    Additional provider notes:     Assessment:     Diagnoses and all orders for this visit:    Acute left-sided thoracic back pain    Myofascial pain       Juanjo Espinoza is a 45 y.o. y.o. male with past medical history significant for psoriasis, hypertension, acute right-sided low back pain  who presents today for follow-up regarding:    -Acute right-sided thoracic back pain has improved.  He is currently not having any pain.     Plan:     A shared decision making plan was used. The patient's values and choices were respected. Prior medical records from our last visit on 5/15/2020 as well as physical therapy notes were reviewed today. The following represents what was discussed and decided upon by the provider and the patient.      "   -DIAGNOSTIC TESTS: Images were personally reviewed and interpreted.   --X-ray of the thoracic spine dated 7/23/2018 is personally viewed and images and interpreted it does show mild degenerative interspace narrowing and endplate spurring.    -INTERVENTIONS: No interventions at this time.    -MEDICATIONS: I recommend that he decrease to 600 mg of gabapentin 3 times a day.  He was on 900 mg 3 times a day.  After 3 days I recommend that he decrease to 300 mg 3 times a day for 3 days and then off of this medication.  Should he find that pain worsens when he is tapering down I recommend that he go up to the last effective dose of medication.  -  Discussed side effects of medications and proper use. Patient verbalized understanding.    -PHYSICAL THERAPY: The patient has had 2 sessions of physical therapy.  Patient continues to do physical therapy at home.  Discussed the importance of core strengthening, ROM, stretching exercises with the patient and how each of these entities is important in decreasing pain.  Explained to the patient that the purpose of physical therapy is to teach the patient a home exercise program.  These exercises need to be performed every day in order to decrease pain and prevent future occurrences of pain.        -PATIENT EDUCATION: We discussed pain management in a multiple fashion including physical therapy, medication management.    -FOLLOW UP: The patient will follow-up as needed.  Advised to contact clinic if symptoms worsen or change.    Subjective:     Juanjo Espinoza is a 45 y.o. male who presents today for follow-up regarding acute right-sided thoracic pain.  Patient reports that after his first visit of physical therapy he has not had pain since then.  He also notes that gabapentin has possibly been helpful as pain decreased as he started taking this.  He is currently taking gabapentin 900 mg 3 times a day.  Pain was worse when he was laying on his right side for a long period of time.   His pain today is 0/10 at its worst was 8/10.  He does not lay on his right side he does not have any pain either.  He has had 2 sessions of physical therapy.  In discussion with his physical therapist they agreed that he should continue doing home exercises and if pain worsened that he should follow-up with more physical therapy.  He reports that he is groggy with the gabapentin, however if he keeps moving he does not fall asleep.  He denies any bowel or bladder changes, fevers, chills, unintentional weight loss.  Currently he is in no pain at all.    Evaluation to Date: X-ray of the thoracic spine dated 7/23/2018.  X-ray of the chest dated 4/27/2020.    Treatment to Date: 2 sessions of physical therapy.    Patient Active Problem List   Diagnosis     Psoriasis     Essential hypertension     Acute right-sided low back pain without sciatica       Current Outpatient Medications on File Prior to Encounter   Medication Sig Dispense Refill     amLODIPine (NORVASC) 10 MG tablet TAKE 1 TABLET(10 MG) BY MOUTH DAILY 90 tablet 3     gabapentin (NEURONTIN) 300 MG capsule Take 1 capsule (300 mg total) by mouth 3 (three) times a day. Increase to 3 tablets three times a day as instructed. 270 capsule 1     ibuprofen (ADVIL,MOTRIN) 200 MG tablet Take 200 mg by mouth every 6 (six) hours as needed for pain.       meloxicam (MOBIC) 15 MG tablet Take 1 tablet (15 mg total) by mouth daily. 30 tablet 1     predniSONE (DELTASONE) 20 MG tablet Take 20 mg by mouth daily. 7 tablet 0     No current facility-administered medications on file prior to encounter.        No Known Allergies    No past medical history on file.     Review of Systems  ROS:   Specifically negative for bowel/bladder dysfunction, balance changes, headache, dizziness, foot drop, fevers, chills, appetite changes, nausea/vomiting, unexplained weight loss. Otherwise 13 systems reviewed are negative. Please see the patient's intake questionnaire from today for  details.    Reviewed Social, Family, Past Medical and Past Surgical history with patient, no significant changes noted since prior visit.     Objective:     There were no vitals taken for this visit.    PHYSICAL EXAMINATION:    --CONSTITUTIONAL: Well developed, well nourished, healthy appearing individual.  --PSYCHIATRIC: Appropriate mood and affect. No difficulty interacting due to temper, social withdrawal, or memory issues.  --SKIN: Lumbar region is dry and intact.   --RESPIRATORY: Normal rhythm and effort. No abnormal accessory muscle breathing patterns noted.   --MUSCULOSKELETAL:  Normal lumbar lordosis noted, no lateral shift.  --GROSS MOTOR: Easily arises from a seated position.   --Thoracic SPINE:    He denies any tenderness in his thoracic spine.    --LOWER EXTREMITY MOTOR TESTING:  Appears to have full strength in lower extremities.  Denies any weakness.    RESULTS:   Imaging: Lumbar spine imaging was reviewed today. The images were shown to the patient and the findings were explained using a spine model.    Video-Visit Details    Type of service:  Video Visit    Video End Time (time video stopped): 3:33 PM  Originating Location (pt. Location): Home    Distant Location (provider location):  [facility]     Platform used for Video Visit: Christina Hall DO

## 2021-06-08 NOTE — PATIENT INSTRUCTIONS - HE
I recommend that you continue doing your physical therapy exercises at home.    You can back down on your gabapentin to 600 mg 3 times a day for 3 days and if pain does not return decreased to 300 mg 3 times a day for 3 days and then go off the medication.  Should you find at any time that pain is increasing while you are going down I would go back up to the last effective dose that you are taking.    ~Please call Nurse Navigation line [phone] with any questions or concerns about your treatment plan, if symptoms worsen and you would like to be seen urgently, or if you have problems controlling bladder and bowel function.

## 2021-06-13 NOTE — TELEPHONE ENCOUNTER
Refill Approved    Rx renewed per Medication Renewal Policy. Medication was last renewed on 10/18/19.    Edel Arauz, [facility] Triage/Med Refill 12/9/2020     Requested Prescriptions   Pending Prescriptions Disp Refills     amLODIPine (NORVASC) 10 MG tablet 90 tablet 3     Sig: Take 1 tablet (10 mg total) by mouth daily.       Calcium-Channel Blockers Protocol Passed - 12/8/2020 10:10 AM        Passed - PCP or prescribing provider visit in past 12 months or next 3 months     Last office visit with prescriber/PCP: Visit date not found OR same dept: 4/27/2020 Luis Alberto Sutherland MD OR same specialty: 4/27/2020 Luis Alberto Sutherland MD  Last physical: Visit date not found Last MTM visit: Visit date not found   Next visit within 3 mo: Visit date not found  Next physical within 3 mo: Visit date not found  Prescriber OR PCP: Alecia Ceja MD  Last diagnosis associated with med order: 1. Essential hypertension  - amLODIPine (NORVASC) 10 MG tablet; Take 1 tablet (10 mg total) by mouth daily.  Dispense: 90 tablet; Refill: 3    If protocol passes may refill for 12 months if within 3 months of last provider visit (or a total of 15 months).             Passed - Blood pressure filed in past 12 months     BP Readings from Last 1 Encounters:   04/27/20 128/82

## 2021-06-16 PROBLEM — M54.50 ACUTE RIGHT-SIDED LOW BACK PAIN WITHOUT SCIATICA: Status: ACTIVE | Noted: 2018-05-16

## 2021-06-16 PROBLEM — I10 ESSENTIAL HYPERTENSION: Status: ACTIVE | Noted: 2018-05-03

## 2021-06-16 NOTE — PATIENT INSTRUCTIONS - HE
Ruled.me for recipe ideas  Get a blood pressure cuff  Start lisinopril  
0 = swallows foods/liquids without difficulty

## 2021-06-16 NOTE — PROGRESS NOTES
Assessment & Plan     Essential hypertension  Given the obvious side effect from the formulation change of the amlodipine given its cessation and immediate improvement of symptoms, would recommend permanent discontinuation on amlodipine.  Initiated patient on lisinopril, instead, given the family history of diabetes, and we can see if this is the appropriate dose.  He will monitor his blood pressures at home and will report back if he notices anything that appears out of range.  He also needs to return for a physical.  - lisinopriL (PRINIVIL,ZESTRIL) 10 MG tablet  Dispense: 90 tablet; Refill: 3       Tobacco Cessation:   reports that he has been smoking. He has been smoking about 0.50 packs per day. He has never used smokeless tobacco.  Return in about 4 weeks (around 5/13/2021) for Annual physical.    Birgit Trejo MD  [facility]   Juanjo Espinoza is 45 y.o. and presents today for the following health issues   HPI   Patient was initiated on amlodipine many months ago.  He tolerated it very well.  However, he switched formulations recently and immediately noticed pain in his knees bilaterally.  He confirmed that there had been a formulary switch with the pharmacy and subsequently discontinued the amlodipine for 3 days to see if he noted any improvement and it was dramatic and santillan.  However, his blood pressure then climbed.  He noticed no side effects such as headache or vision changes.  He does have a plan of weight loss and exercise in the future.    Review of Systems  Fatigue and back pain      Objective    BP (!) 128/94   Pulse 76   Wt (!) 287 lb (130.2 kg)   SpO2 98%   BMI 38.92 kg/m    Body mass index is 38.92 kg/m .  Physical Exam  Gen: NAD  Psych: Normal affect, no hallucinations or delusions, not tearful

## 2021-06-17 NOTE — PROGRESS NOTES
Assessment/Plan:     Patient presents to clinic for evaluation of acute back pain and a flare of chronic hip discomfort.    1. Right hip pain  No imaging obtained today, benign physical examination.  Will treat with infrequent dosing of high-dose ibuprofen and more liberal dosing of topical diclofenac as needed.  Would consider x-rays in the future if no improvement.  Suspect a component of arthritis given the previous trauma.  - ibuprofen (ADVIL,MOTRIN) 600 MG tablet; Take 1 tablet (600 mg total) by mouth every 6 (six) hours as needed for pain.  Dispense: 30 tablet; Refill: 0  - diclofenac sodium (VOLTAREN) 1 % Gel; Apply twice daily PRN to right hip  Dispense: 100 g; Refill: 0    2. Acute right-sided thoracic back pain  Believe this to be muscular in nature, discussed the psoas musculature and patient does feel this traces the course of where the pain is.  Will attempt Flexeril and heat for treatment.  - cyclobenzaprine (FLEXERIL) 5 MG tablet; Take 1 tablet (5 mg total) by mouth 3 (three) times a day as needed for muscle spasms.  Dispense: 60 tablet; Refill: 0    3. Essential hypertension  Although not able to technically diagnose hypertension at this visit given his infrequent physician appointments, suspect patient's blood pressure is typically elevated and patient was comfortable starting a medication today.  Will follow up in 2 or 3 weeks to determine how he is faring.  - amLODIPine (NORVASC) 5 MG tablet; Take 1 tablet (5 mg total) by mouth daily.  Dispense: 30 tablet; Refill: 0    4. Healthcare maintenance  Patient will return for a routine physical and we will appreciate his lipid panel and CMP at that time.  - [facility] FASTING  - Comprehensive Metabolic Panel    Discontinued Medications:  There are no discontinued medications.    AVS printed and given to patient.  Return to clinic in 2-3 weeks.    Total time spent with patient was 25 minutes with greater than 50% spent in face-to-face counseling  "regarding the above plan.    This note has been dictated using voice recognition software. Any grammatical or context distortions are unintentional and inherent to the the software.     Birgit Trejo MD  Family Medicine [facility]      Subjective:      Juanjo Espinoza is a 43 y.o. male who presents to clinic for back pain and difficulty walking.    Patient was injured while playing football who is approximately 14 years old.  He is unsure exactly what happened but he \"broke the growth plate\" and had 2 titanium screws surgically implanted.  He did well for the next several decades, but intermittently began noticing some arthritis that was worse with some weather changes.  Occasional stepped wrong and feel a pinch but in this last week it is been dramatically worse.  It was a 10 out of 10 pain at its absolute worst and a 1 out of 10 today.  He works in a fabrication plant working with steel and finds that he is unable to function during flares of this pain.  It is painful to walk.  It is exacerbated by his working and described as a stabbing hip pain that is also pinching pain.  He also is experiencing a back pain that is an aching pain that is deep.  It does not radiate.  He is treating with Tylenol to no effect.  He finds that it is hard to lay down originally at the end of the day but once he lays down he feels much better.  In the morning he feels fine and it is not exacerbated until he performs strenuous activity.    In addition, patient has not seen a physician in 5 years.  He does occasionally get pressure in his head and he wears a heart rate monitor notices a correlation.      Past Medical History, Family History, and Social History reviewed.   History   Smoking Status     Current Every Day Smoker     Packs/day: 0.50   Smokeless Tobacco     Never Used       Review of systems is as stated in HPI.  Patient endorses: fatigue, joint pain, and back pain and pressure in his head  The remainder of the 10 " system review is otherwise negative.    Objective:     BP (!) 150/106  Pulse (!) 103  Ht 6' (1.829 m)  Wt (!) 282 lb (127.9 kg)  SpO2 94%  BMI 38.25 kg/m2 Body mass index is 38.25 kg/(m^2).    Gen: Alert, NAD, appears stated age, normal hygiene   MSK: Full range of motion of spine, mild tenderness with very deep palpation in the mid scapular line on the right at approximately the lower thoracic and upper lumbar area; tenderness to palpation directly over the femoral head  Neuro: CN II-XII grossly intact, no deficits in coordination  Psych: no apparent hallucinations or delusions, no pressured speech; alert, oriented x3        No current outpatient prescriptions on file prior to visit.     No current facility-administered medications on file prior to visit.

## 2021-06-18 NOTE — PATIENT INSTRUCTIONS - HE
Patient Instructions by Kita Berry PT at 6/1/2020  2:30 PM     Author: Kita Berry PT Service: -- Author Type: Physical Therapist    Filed: 6/1/2020  2:48 PM Encounter Date: 6/1/2020 Status: Signed    : Kita Berry PT (Physical Therapist)        ELASTIC BAND ROWS     Holding elastic band with both hands, draw back the band as you bend your elbows. Keep your elbows near the side of your body.    x15-20 repetitions, hold a couple seconds, 2-3 sets, 5 days week.        ELASTIC BAND EXTENSION BILATERAL SHOULDER    While holding an elastic band with both arms in front of you with your elbows straight, pull the band downwards and back towards your side.    x15-20 repetitions, hold a couple seconds, 2-3 sets, 5 days week.      DOORWAY STRETCH - HIGH    While standing in a doorway, place your arms up on the door frame and lean in until a stretch is felt along the front of your chest and/or shoulders. Your upper arms should be placed upward along the door frame.     NOTE: Your legs should control how much you stretch by bending or straightening your knee through the doorway.    Hold 30seconds

## 2021-06-18 NOTE — PROGRESS NOTES
Assessment/Plan:     Patient presents to clinic to follow-up on both his back pain and his blood pressure.    1. Acute right-sided low back pain without sciatica  -Continue Flexeril and ibuprofen as needed, consider back brace to help with awareness to help prevent future injuries, filled out [place] paperwork    2. Essential hypertension  - increased amlodipine to 10 mg    Discontinued Medications:  Medications Discontinued During This Encounter   Medication Reason     amLODIPine (NORVASC) 5 MG tablet        Total time spent with patient was 25 minutes with greater than 50% spent in face-to-face counseling regarding the above plan.    This note has been dictated using voice recognition software. Any grammatical or context distortions are unintentional and inherent to the the software.     Birgit Trejo MD  Family Medicine [facility]      Subjective:      Juanjo Espinoza is a 43 y.o. male who presents to clinic for paperwork.    Patient needed FMLA paperwork filled out for his recent absent secondary to a flare in his known back pain.  Patient was seen on May 3.  At that point he had performed a bending maneuver with a twist and had injured his right back and right hip.  The pain was located in the musculature lateral to the spine, no evidence of any spinal involvement, but significant muscle spasm.  The pain radiated down into the hip and there is tenderness directly over the femoral head on the right.  He was given Voltaren gel which did not help and ibuprofen which did.  He also took Flexeril which she found was important to return to baseline functioning.  He took 3 days off of work and would like to fill out paperwork today to justify his absence.  There is also some concern that should he bend incorrectly in the future that this flare could return.  He states his leg pain has now resolved and his back pain continues to improve every day.    Patient was also diagnosed with hypertension and was initiated  on amlodipine at last appointment.  He is noticing no side effects.    Old records reviewed:   Previous note    Past Medical History, Family History, and Social History reviewed.   History   Smoking Status     Current Every Day Smoker     Packs/day: 0.50   Smokeless Tobacco     Never Used       Review of systems is as stated in HPI.  Patient endorses: fatigue, joint pain, back pain, increased thirst, headaches  The remainder of the 10 system review is otherwise negative.    Objective:     BP (!) 144/104  Pulse 90  Ht 6' (1.829 m)  Wt (!) 281 lb (127.5 kg)  SpO2 97%  BMI 38.11 kg/m2 Body mass index is 38.11 kg/(m^2).    Gen: Alert, NAD, appears stated age, normal hygiene   Psych: no apparent hallucinations or delusions, no pressured speech; alert, oriented x3      Current Outpatient Prescriptions on File Prior to Visit   Medication Sig Dispense Refill     cyclobenzaprine (FLEXERIL) 5 MG tablet Take 1 tablet (5 mg total) by mouth 3 (three) times a day as needed for muscle spasms. 60 tablet 0     diclofenac sodium (VOLTAREN) 1 % Gel Apply twice daily PRN to right hip 100 g 0     ibuprofen (ADVIL,MOTRIN) 600 MG tablet Take 1 tablet (600 mg total) by mouth every 6 (six) hours as needed for pain. 30 tablet 0     [DISCONTINUED] amLODIPine (NORVASC) 5 MG tablet Take 1 tablet (5 mg total) by mouth daily. 30 tablet 0     No current facility-administered medications on file prior to visit.

## 2021-06-19 NOTE — PROGRESS NOTES
Assessment/Plan:     Patient's back pain now appears more chronic than acute.  With extensive discussion, we decided on an x-ray today to ensure there is no pathology prohibiting patient from initiating physical therapy.  If the x-ray is normal, patient will start the physical therapy which is ordered today.  Recommended Tylenol for pain control and applying the diclofenac gel directly to the spine.  Patient will continue to modify his activities to preserve his functioning.  We discussed return precautions including red warning symptoms of spinal cord compression.    1. Chronic midline thoracic back pain  - XR Thoracic Spine 2 VWS; Future  - Ambulatory referral to PT/OT    Discontinued Medications:  Medications Discontinued During This Encounter   Medication Reason     ibuprofen (ADVIL,MOTRIN) 600 MG tablet      cyclobenzaprine (FLEXERIL) 5 MG tablet      diclofenac sodium (VOLTAREN) 1 % Gel        Return to clinic after several weeks of physical therapy.     Total time spent with patient was 15 minutes with greater than 50% spent in face-to-face counseling regarding the above plan.    This note has been dictated using voice recognition software. Any grammatical or context distortions are unintentional and inherent to the the software.     Birgit Trejo MD  Family Medicine [facility]      Subjective:      Juanjo Espinoza is a 43 y.o. male who presents to clinic for persistent back pain.    Patient did have an inciting incident several months ago, and he did notice some improvement, but now the quality of the pain is changed.  Instead of being a muscular spasm kind of pain, he notices more of a sharp pain.  He is fine after a good night of rest and is able to perform most of the duties that he does at work.  However, by the end of the day, he finds that the pain is significantly intense and is worse by the time he gets home.  He mitigates the pain with the diclofenac cream but has stopped taking  "ibuprofen.  He also has stopped using Flexeril as he does not believe it is a muscular pain and he feels it mostly just makes him \"on interested in moving\".  He does try massage and it does help slightly.  Mostly, he tries behavioral modifications with an increased focus on rest.  Patient denies red warning signs of urinary retention, fecal incontinence, and saddle anesthesia.    Old records reviewed:   - previous note    Past Medical History, Family History, and Social History reviewed.   History   Smoking Status     Current Every Day Smoker     Packs/day: 0.50   Smokeless Tobacco     Never Used       Review of systems is as stated in HPI.  The remainder of the 10 system review is otherwise negative.    Objective:     /82  Pulse 72  Ht 6' (1.829 m)  Wt (!) 278 lb (126.1 kg)  SpO2 97%  BMI 37.7 kg/m2 Body mass index is 37.7 kg/(m^2).    Gen: Alert, NAD, appears stated age, normal hygiene   MSK: grossly full range of motion in all joints, no obvious deformity, no significant tenderness to palpation, patient can localize the pain to the lower thoracic spine      Current Outpatient Prescriptions on File Prior to Visit   Medication Sig Dispense Refill     amLODIPine (NORVASC) 10 MG tablet Take 1 tablet (10 mg total) by mouth daily. 90 tablet 3     [DISCONTINUED] cyclobenzaprine (FLEXERIL) 5 MG tablet Take 1 tablet (5 mg total) by mouth 3 (three) times a day as needed for muscle spasms. 60 tablet 0     [DISCONTINUED] diclofenac sodium (VOLTAREN) 1 % Gel Apply twice daily PRN to right hip 100 g 0     [DISCONTINUED] ibuprofen (ADVIL,MOTRIN) 600 MG tablet Take 1 tablet (600 mg total) by mouth every 6 (six) hours as needed for pain. 30 tablet 0     No current facility-administered medications on file prior to visit.                "

## 2022-06-08 DIAGNOSIS — I10 ESSENTIAL HYPERTENSION: ICD-10-CM

## 2022-06-08 RX ORDER — LISINOPRIL 10 MG/1
10 TABLET ORAL DAILY
Qty: 30 TABLET | Refills: 0 | Status: SHIPPED | OUTPATIENT
Start: 2022-06-08 | End: 2023-04-11

## 2022-06-08 NOTE — TELEPHONE ENCOUNTER
Refill sent.  This will be last refill until patient is seen in clinic.  Please help him to schedule appointment

## 2022-06-08 NOTE — TELEPHONE ENCOUNTER
Refill Request  Medication name: Pending Prescriptions:                       Disp   Refills    lisinopril (ZESTRIL) 10 MG tablet         30 tab*0            Sig: Take 1 tablet (10 mg) by mouth daily    Who prescribed the medication: kendra Ceja  Last refill on medication: 5/2/22  Requested Pharmacy: [facility]  Last appointment with PCP: No ifrah 5/26/22  Next appointment: Not scheduled

## 2022-10-18 ENCOUNTER — IMMUNIZATION (OUTPATIENT)
Dept: FAMILY MEDICINE | Facility: CLINIC | Age: 47
End: 2022-10-18
Payer: COMMERCIAL

## 2022-10-18 PROCEDURE — 0051A COVID-19,PF,PFIZER (12+ YRS): CPT

## 2022-10-18 PROCEDURE — 91305 COVID-19,PF,PFIZER (12+ YRS): CPT

## 2022-11-09 ENCOUNTER — ALLIED HEALTH/NURSE VISIT (OUTPATIENT)
Dept: FAMILY MEDICINE | Facility: CLINIC | Age: 47
End: 2022-11-09
Payer: COMMERCIAL

## 2022-11-09 DIAGNOSIS — Z00.00 HEALTHCARE MAINTENANCE: Primary | ICD-10-CM

## 2022-11-09 PROCEDURE — 0052A COVID-19,PF,PFIZER (12+ YRS): CPT

## 2022-11-09 PROCEDURE — 91305 COVID-19,PF,PFIZER (12+ YRS): CPT

## 2022-11-09 PROCEDURE — 99207 PR NO CHARGE NURSE ONLY: CPT

## 2023-04-11 ENCOUNTER — OFFICE VISIT (OUTPATIENT)
Dept: FAMILY MEDICINE | Facility: CLINIC | Age: 48
End: 2023-04-11
Payer: COMMERCIAL

## 2023-04-11 VITALS
TEMPERATURE: 98.2 F | SYSTOLIC BLOOD PRESSURE: 175 MMHG | WEIGHT: 289.4 LBS | HEIGHT: 71 IN | HEART RATE: 85 BPM | RESPIRATION RATE: 12 BRPM | DIASTOLIC BLOOD PRESSURE: 117 MMHG | BODY MASS INDEX: 40.52 KG/M2 | OXYGEN SATURATION: 98 %

## 2023-04-11 DIAGNOSIS — I10 BENIGN ESSENTIAL HYPERTENSION: ICD-10-CM

## 2023-04-11 DIAGNOSIS — Z00.00 ANNUAL PHYSICAL EXAM: Primary | ICD-10-CM

## 2023-04-11 DIAGNOSIS — E66.01 MORBID OBESITY (H): ICD-10-CM

## 2023-04-11 DIAGNOSIS — G47.33 OSA (OBSTRUCTIVE SLEEP APNEA): ICD-10-CM

## 2023-04-11 LAB
ALBUMIN SERPL BCG-MCNC: 4.5 G/DL (ref 3.5–5.2)
ALBUMIN UR-MCNC: 100 MG/DL
ALP SERPL-CCNC: 77 U/L (ref 40–129)
ALT SERPL W P-5'-P-CCNC: 11 U/L (ref 10–50)
ANION GAP SERPL CALCULATED.3IONS-SCNC: 14 MMOL/L (ref 7–15)
APPEARANCE UR: CLEAR
AST SERPL W P-5'-P-CCNC: 17 U/L (ref 10–50)
BACTERIA #/AREA URNS HPF: ABNORMAL /HPF
BILIRUB SERPL-MCNC: 0.3 MG/DL
BILIRUB UR QL STRIP: NEGATIVE
BUN SERPL-MCNC: 10.1 MG/DL (ref 6–20)
CALCIUM SERPL-MCNC: 9.3 MG/DL (ref 8.6–10)
CHLORIDE SERPL-SCNC: 105 MMOL/L (ref 98–107)
CHOLEST SERPL-MCNC: 210 MG/DL
COLOR UR AUTO: YELLOW
CREAT SERPL-MCNC: 1.04 MG/DL (ref 0.67–1.17)
DEPRECATED HCO3 PLAS-SCNC: 23 MMOL/L (ref 22–29)
ERYTHROCYTE [DISTWIDTH] IN BLOOD BY AUTOMATED COUNT: 12.9 % (ref 10–15)
GFR SERPL CREATININE-BSD FRML MDRD: 90 ML/MIN/1.73M2
GLUCOSE SERPL-MCNC: 103 MG/DL (ref 70–99)
GLUCOSE UR STRIP-MCNC: NEGATIVE MG/DL
HBA1C MFR BLD: 6.1 % (ref 0–5.6)
HCT VFR BLD AUTO: 46.1 % (ref 40–53)
HDLC SERPL-MCNC: 44 MG/DL
HGB BLD-MCNC: 15.2 G/DL (ref 13.3–17.7)
HGB UR QL STRIP: ABNORMAL
KETONES UR STRIP-MCNC: NEGATIVE MG/DL
LDLC SERPL CALC-MCNC: 141 MG/DL
LEUKOCYTE ESTERASE UR QL STRIP: NEGATIVE
MCH RBC QN AUTO: 28.4 PG (ref 26.5–33)
MCHC RBC AUTO-ENTMCNC: 33 G/DL (ref 31.5–36.5)
MCV RBC AUTO: 86 FL (ref 78–100)
NITRATE UR QL: NEGATIVE
NONHDLC SERPL-MCNC: 166 MG/DL
PH UR STRIP: 7 [PH] (ref 5–8)
PLATELET # BLD AUTO: 232 10E3/UL (ref 150–450)
POTASSIUM SERPL-SCNC: 3.8 MMOL/L (ref 3.4–5.3)
PROT SERPL-MCNC: 7.5 G/DL (ref 6.4–8.3)
PSA SERPL DL<=0.01 NG/ML-MCNC: 0.51 NG/ML (ref 0–2.5)
RBC # BLD AUTO: 5.35 10E6/UL (ref 4.4–5.9)
RBC #/AREA URNS AUTO: ABNORMAL /HPF
SODIUM SERPL-SCNC: 142 MMOL/L (ref 136–145)
SP GR UR STRIP: 1.02 (ref 1–1.03)
SQUAMOUS #/AREA URNS AUTO: ABNORMAL /LPF
TRIGL SERPL-MCNC: 126 MG/DL
UROBILINOGEN UR STRIP-ACNC: 0.2 E.U./DL
WBC # BLD AUTO: 8.1 10E3/UL (ref 4–11)
WBC #/AREA URNS AUTO: ABNORMAL /HPF

## 2023-04-11 PROCEDURE — 80053 COMPREHEN METABOLIC PANEL: CPT | Performed by: FAMILY MEDICINE

## 2023-04-11 PROCEDURE — 83036 HEMOGLOBIN GLYCOSYLATED A1C: CPT | Performed by: FAMILY MEDICINE

## 2023-04-11 PROCEDURE — 81001 URINALYSIS AUTO W/SCOPE: CPT | Performed by: FAMILY MEDICINE

## 2023-04-11 PROCEDURE — 99214 OFFICE O/P EST MOD 30 MIN: CPT | Mod: 25 | Performed by: FAMILY MEDICINE

## 2023-04-11 PROCEDURE — 80061 LIPID PANEL: CPT | Performed by: FAMILY MEDICINE

## 2023-04-11 PROCEDURE — 85027 COMPLETE CBC AUTOMATED: CPT | Performed by: FAMILY MEDICINE

## 2023-04-11 PROCEDURE — 99396 PREV VISIT EST AGE 40-64: CPT | Performed by: FAMILY MEDICINE

## 2023-04-11 PROCEDURE — 36415 COLL VENOUS BLD VENIPUNCTURE: CPT | Performed by: FAMILY MEDICINE

## 2023-04-11 PROCEDURE — G0103 PSA SCREENING: HCPCS | Performed by: FAMILY MEDICINE

## 2023-04-11 RX ORDER — LISINOPRIL/HYDROCHLOROTHIAZIDE 10-12.5 MG
1 TABLET ORAL DAILY
Qty: 60 TABLET | Refills: 1 | Status: SHIPPED | OUTPATIENT
Start: 2023-04-11 | End: 2023-04-28

## 2023-04-11 ASSESSMENT — ENCOUNTER SYMPTOMS
FEVER: 0
WEAKNESS: 0
FREQUENCY: 0
CHILLS: 0
SORE THROAT: 0
DYSURIA: 0
NERVOUS/ANXIOUS: 0
HEARTBURN: 0
HEMATOCHEZIA: 0
PARESTHESIAS: 0
COUGH: 0
PALPITATIONS: 0
DIZZINESS: 0
SHORTNESS OF BREATH: 0
ABDOMINAL PAIN: 0
CONSTIPATION: 0
HEADACHES: 0
MYALGIAS: 0
NAUSEA: 0
DIARRHEA: 0
EYE PAIN: 0
HEMATURIA: 0
ARTHRALGIAS: 1

## 2023-04-11 ASSESSMENT — PAIN SCALES - GENERAL: PAINLEVEL: NO PAIN (0)

## 2023-04-11 NOTE — PROGRESS NOTES
SUBJECTIVE:   CC: Juanjo is an 47 year old who presents for preventative health visit.        View : No data to display.            Patient has been advised of split billing requirements and indicates understanding: Yes  HPI  Ability to successfully perform activities of daily living: Yes, no assistance needed  Home safety:  none identified   Hearing impairment: Yes,   History of present illness and assessment: The patient presents today for evaluation of high blood pressure and not feeling well.  He has not been in to see a physician in over a year.  Previously he was treated for benign essential hypertension with lisinopril 10 mg but follow-up was lacking.  He discontinued the medication due to cost and loss of insurance.  Family history is positive for hypertensive vascular disease, father had cancer could have been bladder cancer according to history.  Brothers and siblings are overweight and have some health issues mostly degenerative joint disease.  A sister is living and as far as the patient know she has degenerative type of joint disease due to being overweight.  The patient is a smoker cigarettes 1 to 2 pack/day.  He has tried to quit in the past but has been unsuccessful.  He is always been overweight.  He reports poor sleep he awakes at night and is partner and significant other who has been living with for 2-1/2 decades and has had 3 children with states that he stops breathing.  Patient gets up at night paces the floor.  He also states that he discontinued lisinopril because he thought that was causing his sleep issue.  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  He needs a sleep evaluation I have placed that referral.  Blood pressure is high today.  He is does not take home blood pressure readings.  We stressed the importance of him doing that in a restful state twice weekly charting this returning here in 1 month for follow-up with more  readings.  I Nikky start him on lisinopril hydrochlorothiazide because of the readings and his past medical history.  Side effect profile discussed.  He is not experiencing any sexual side effects at this point or ED issues.  He has taken some ibuprofen in the past for a bad left knee.  Issues today were treatment of the hypertension discussion of tobacco cessation evaluation of sleep apnea.  We need to rule out diabetes.  I did do a prostate exam which was normal patient's never had that exam he is 47.  He will need a colonoscopy down the road.  I did want to hit him with all of the things that we need to do at today's visit but when he returns in 1 month I stressed the importance of him seeing Dr. Ceja for follow-up and also to pursue a recommendation for colonoscopy.  Very nice individual          Today's PHQ-2 Score:       4/11/2023     9:57 AM   PHQ-2 ( 1999 [employer])   PHQ-2 Score Incomplete       Have you ever done Advance Care Planning? (For example, a Health Directive, POLST, or a discussion with a medical provider or your loved ones about your wishes): No, advance care planning information given to patient to review.  Patient plans to discuss their wishes with loved ones or provider.      Social History     Tobacco Use     Smoking status: Every Day     Packs/day: 0.50     Types: Cigarettes     Smokeless tobacco: Never   Vaping Use     Vaping status: Not on file   Substance Use Topics     Alcohol use: Not on file              View : No data to display.                Last PSA: No results found for: PSA    Reviewed orders with patient. Reviewed health maintenance and updated orders accordingly - Yes  Lab work is in process    Reviewed and updated as needed this visit by clinical staff   Tobacco  Allergies  Meds              Reviewed and updated as needed this visit by Provider                     Review of Systems  CONSTITUTIONAL: NEGATIVE for fever, chills, change in weight  INTEGUMENTARY/SKIN: NEGATIVE  for worrisome rashes, moles or lesions  EYES: NEGATIVE for vision changes or irritation  ENT: NEGATIVE for ear, mouth and throat problems  RESP: NEGATIVE for significant cough or SOB  CV: NEGATIVE for chest pain, palpitations or peripheral edema  GI: NEGATIVE for nausea, abdominal pain, heartburn, or change in bowel habits   male: negative for dysuria, hematuria, decreased urinary stream, erectile dysfunction, urethral discharge  MUSCULOSKELETAL: NEGATIVE for significant arthralgias or myalgia  NEURO: NEGATIVE for weakness, dizziness or paresthesias  PSYCHIATRIC: NEGATIVE for changes in mood or affect    OBJECTIVE:   There were no vitals taken for this visit.    Physical Exam      General Appearance:  Alert, cooperative, no distress  Head:  Normocephalic, no obvious abnormality  Ears: TM anatomy normal  Eyes:  PERRL, EOM's intact, conjunctiva and corneas clear  Nose:  Nares symmetrical, septum midline, mucosa pink, no sinus tenderness  Throat:  Lips, tongue, and mucosa are moist, pink, and intact  Neck:  Supple, symmetrical, trachea midline, no adenopathy; thyroid: no enlargement, symmetric,no tenderness/mass/nodules; no carotid bruit, no JVD  Back:  Symmetrical, no curvature, ROM normal, no CVA tenderness  Chest/Breast:  No mass or tenderness  Lungs:  Clear to auscultation bilaterally, respirations unlabored   Heart:  Normal PMI, regular rate & rhythm, S1 and S2 normal, no murmurs, rubs, or gallops  Abdomen:  Soft, non-tender, bowel sounds active all four quadrants, no mass, or organomegaly  Musculoskeletal:  Tone and strength strong and symmetrical, all extremities  Lymphatic:  No adenopathy  Skin/Hair/Nails:  Skin warm, dry, and intact, no rashes  Neurologic:  Alert and oriented x3, no cranial nerve deficits, normal strength and tone, gait steady  Extremities:  No edema.  Noel's sign negative.    Genitourinary: deferred  Pulses:  Equal bilaterally    ASSESSMENT/PLAN:       ICD-10-CM    1. Annual physical exam   Z00.00 Lipid panel reflex to direct LDL Non-fasting     CBC with platelets     Comprehensive metabolic panel     Lipid panel reflex to direct LDL Fasting     Prostate Specific Antigen Screen     UA Macroscopic with reflex to Microscopic and Culture     HEMOGLOBIN A1C     Lipid panel reflex to direct LDL Non-fasting     CBC with platelets     Comprehensive metabolic panel     Prostate Specific Antigen Screen     UA Macroscopic with reflex to Microscopic and Culture     HEMOGLOBIN A1C     CANCELED: Lipid panel reflex to direct LDL Fasting      2. Morbid obesity (H)  E66.01       3. Benign essential hypertension  I10 CBC with platelets     Comprehensive metabolic panel     Lipid panel reflex to direct LDL Fasting     UA Macroscopic with reflex to Microscopic and Culture     lisinopril-hydrochlorothiazide (ZESTORETIC) 10-12.5 MG tablet     CBC with platelets     Comprehensive metabolic panel     UA Macroscopic with reflex to Microscopic and Culture     CANCELED: Lipid panel reflex to direct LDL Fasting      4. ARCHIE (obstructive sleep apnea)  G47.33 Adult Sleep Eval & Management Referral          Patient has been advised of split billing requirements and indicates understanding: Yes      COUNSELING:           He reports that he has been smoking. He has been smoking an average of .5 packs per day. He has never used smokeless tobacco.  Nicotine/Tobacco Cessation Plan:   Information offered: Patient not interested at this time        Randolph Pop MD  [employer]

## 2023-04-25 ENCOUNTER — NURSE TRIAGE (OUTPATIENT)
Dept: NURSING | Facility: CLINIC | Age: 48
End: 2023-04-25
Payer: COMMERCIAL

## 2023-04-26 NOTE — TELEPHONE ENCOUNTER
Nurse Triage SBAR    Situation:   -Hypertension     Background:   -Patient calling, It is okay to leave a detailed message at this number.     Assessment:   -today BP was 220/140 x2 (20 min ago)   -175/117 Abnormal   as of 4/11/2023  -HA and head feel tight   -felt anxious   -On 4/11/23 switched from lisinopril to lisinopril-hydrochlorothiazide   -last pill was this AM at 0600    Recommendation:   See HCP Within 4 Hours (Or PCP Triage)  Called On Call Dr. Taylor at 1941,  No answer left message via answering  Called back via answering Sx at 2007:  He gave the following recommendations:  -repeat dose of lisinopril-hydrochlorothiazide, call back if BP is still high  -take normal dose in the AM, re-check BP and follow up with PCP  -Go to ED with Chest pain, blurred vision, unsteady gait or any other stroke like symptoms    Provider Recommendation Follow Up:   Reached patient/caregiver. Informed of provider's recommendations. Patient verbalized understanding and agrees with the plan.     PCP care team: please see notes above r/t hypertension concern.  Dose he need sooner follow up or adjustment in medications?    QUE LEONARD RN on 4/25/2023 at 8:11 PM         Reason for Disposition    [1] Systolic BP  >= 200 OR Diastolic >= 120 AND [2] having NO cardiac or neurologic symptoms    Additional Information    Negative: Difficult to awaken or acting confused (e.g., disoriented, slurred speech)    Negative: SEVERE difficulty breathing (e.g., struggling for each breath, speaks in single words)    Negative: [1] Weakness of the face, arm or leg on one side of the body AND [2] new-onset    Negative: [1] Numbness (i.e., loss of sensation) of the face, arm or leg on one side of the body AND [2] new-onset    Negative: [1] Chest pain lasts > 5 minutes AND [2] history of heart disease (i.e., heart attack, bypass surgery, angina, angioplasty, CHF)    Negative: [1] Chest pain AND [2] took nitrogylcerin AND [3] pain was not  relieved    Negative: Sounds like a life-threatening emergency to the triager    Negative: Symptom is main concern (e.g., headache, chest pain)    Negative: Low blood pressure is main concern    Negative: [1] Systolic BP  >= 160 OR Diastolic >= 100 AND [2] cardiac or neurologic symptoms (e.g., chest pain, difficulty breathing, unsteady gait, blurred vision)    Negative: [1] Pregnant 20 or more weeks (or postpartum < 6 weeks) AND [2] new hand or face swelling    Negative: [1] Pregnant 20 or more weeks (or postpartum < 6 weeks) AND [2] Systolic BP >= 160 OR Diastolic >= 100    Protocols used: BLOOD PRESSURE - HIGH-A-

## 2023-04-26 NOTE — TELEPHONE ENCOUNTER
Provider Recommendation Follow Up:   Per TC, pt returned call and she provided him with provider's info and pt scheduled appt already.    Radha Avila, MICHN, RN  [facility]

## 2023-04-28 ENCOUNTER — OFFICE VISIT (OUTPATIENT)
Dept: FAMILY MEDICINE | Facility: CLINIC | Age: 48
End: 2023-04-28
Payer: COMMERCIAL

## 2023-04-28 VITALS
WEIGHT: 292.5 LBS | HEIGHT: 71 IN | RESPIRATION RATE: 20 BRPM | TEMPERATURE: 97.1 F | SYSTOLIC BLOOD PRESSURE: 152 MMHG | OXYGEN SATURATION: 96 % | BODY MASS INDEX: 40.95 KG/M2 | DIASTOLIC BLOOD PRESSURE: 102 MMHG | HEART RATE: 97 BPM

## 2023-04-28 DIAGNOSIS — E66.01 MORBID OBESITY (H): ICD-10-CM

## 2023-04-28 DIAGNOSIS — F41.9 ANXIETY: ICD-10-CM

## 2023-04-28 DIAGNOSIS — I10 BENIGN ESSENTIAL HYPERTENSION: Primary | ICD-10-CM

## 2023-04-28 PROCEDURE — 84443 ASSAY THYROID STIM HORMONE: CPT | Performed by: FAMILY MEDICINE

## 2023-04-28 PROCEDURE — 36415 COLL VENOUS BLD VENIPUNCTURE: CPT | Performed by: FAMILY MEDICINE

## 2023-04-28 PROCEDURE — 80048 BASIC METABOLIC PNL TOTAL CA: CPT | Performed by: FAMILY MEDICINE

## 2023-04-28 PROCEDURE — 99214 OFFICE O/P EST MOD 30 MIN: CPT | Performed by: FAMILY MEDICINE

## 2023-04-28 RX ORDER — LISINOPRIL AND HYDROCHLOROTHIAZIDE 20; 25 MG/1; MG/1
1 TABLET ORAL DAILY
Qty: 90 TABLET | Refills: 3 | Status: SHIPPED | OUTPATIENT
Start: 2023-04-28 | End: 2023-09-05

## 2023-04-28 ASSESSMENT — PAIN SCALES - GENERAL: PAINLEVEL: NO PAIN (0)

## 2023-04-28 NOTE — PROGRESS NOTES
"  Assessment & Plan     Benign essential hypertension  He has been taking lisinopril-hydrochlorothiazide 10-12.5 mg tablets twice daily for the past few days.  We will have him continue to take this dosage.  Prescription for higher dose medication sent to pharmacy.  Discussed importance of healthy lifestyle changes and weight loss.  Did offer referral to weight management clinic but he declines at this time.  We will follow-up with him again in about 2 weeks.  We will check BMP and TSH today.  Discussed importance of completing evaluation for sleep apnea  - Basic metabolic panel  (Ca, Cl, CO2, Creat, Gluc, K, Na, BUN)  - TSH  - lisinopril-hydrochlorothiazide (ZESTORETIC) 20-25 MG tablet  Dispense: 90 tablet; Refill: 3  - Basic metabolic panel  (Ca, Cl, CO2, Creat, Gluc, K, Na, BUN)  - TSH    Morbid obesity (H)  Encouraged weight loss and healthy lifestyle changes.  Offered referral to weight management clinic but this is declined at this time.    Anxiety  He is not interested in any treatment at this time.               BMI:   Estimated body mass index is 41.38 kg/m  as calculated from the following:    Height as of this encounter: 1.791 m (5' 10.5\").    Weight as of this encounter: 132.7 kg (292 lb 8 oz).   Discussed healthy lifestyle choices        Alecia Ceja MD  [facility]   Juanjo is a 47 year old, presenting for the following health issues:  Hypertension (Follow up for BP. Has not gone much per taking it at home. 176/145 last night. Would like information on quitting smoking )         View : No data to display.              HPI   He comes in today to follow-up on hypertension.  He was recently seen by Dr. George for a physical.  He has history of hypertension but had not been taking his medication for a while.  He was restarted on medication at his physical.  Started on lisinopril-hydrochlorothiazide.  He was advised to follow-up again in 1 month.  He was also referred " "for sleep evaluation due to concern about possible underlying sleep apnea.  That is scheduled in August.    He comes in today because of significantly elevated blood pressures a few days ago.  On Sunday afternoon his blood pressure was 220 systolic.  He contacted nurse triage who got in touch with the on-call doctor.  He was advised to take another dose of his blood pressure medication and his blood pressure went down to about 190 systolic on day 8 of his admission actually when he got home from work and he had noticed this 202.  He took another blood pressure pill and his blood pressure went down to 170.  For the last couple of days he has been taking 2 of his blood pressure medications daily.  1 in the morning and 1 in the afternoon.  Blood pressure was 176 systolic after work yesterday.  He does have some tightness in his head when his blood pressure is high.  Otherwise no symptoms of dizziness or lightheadedness, chest pain or pressure.  He was having some lower extremity edema prior to starting the lisinopril-hydrochlorothiazide.  That has improved.  He is tolerating the medication well without any significant adverse side effects.  He cannot think of any reason why his blood pressure was still high recently.  States that work is going well.  He does not have a stressful job.  Kids are doing well.  Home life is going well.  He does state that he had some trouble sleeping the past few nights.  Has been feeling very anxious at night.  Then cannot fall asleep.  Ends up getting up and doing stuff around the house.  Not sure why this is going on.  He has not had any dietary changes.  He has no other concerns or questions.  Review of systems is otherwise negative.          Review of Systems         Objective    BP (!) 152/102 (BP Location: Right arm, Patient Position: Sitting, Cuff Size: Adult Large)   Pulse 97   Temp 97.1  F (36.2  C) (Temporal)   Resp 20   Ht 1.791 m (5' 10.5\")   Wt 132.7 kg (292 lb 8 oz)   " SpO2 96%   BMI 41.38 kg/m    Body mass index is 41.38 kg/m .  Physical Exam   GENERAL: healthy, alert and no distress  RESP: lungs clear to auscultation - no rales, rhonchi or wheezes  CV: regular rate and rhythm, normal S1 S2, no S3 or S4, no murmur, click or rub, no peripheral edema and peripheral pulses strong  MS: no gross musculoskeletal defects noted, no edema  PSYCH: mentation appears normal, affect normal/bright

## 2023-04-28 NOTE — PATIENT INSTRUCTIONS
Take two tablets of your blood pressure medication daily.  A new prescription for the higher dose has been sent to pharmacy    Send Dr. Ceja BP readings through my chart next week    Work on lifestyle changes and weight loss

## 2023-04-29 LAB
ANION GAP SERPL CALCULATED.3IONS-SCNC: 12 MMOL/L (ref 7–15)
BUN SERPL-MCNC: 20.3 MG/DL (ref 6–20)
CALCIUM SERPL-MCNC: 10.1 MG/DL (ref 8.6–10)
CHLORIDE SERPL-SCNC: 102 MMOL/L (ref 98–107)
CREAT SERPL-MCNC: 1.26 MG/DL (ref 0.67–1.17)
DEPRECATED HCO3 PLAS-SCNC: 28 MMOL/L (ref 22–29)
GFR SERPL CREATININE-BSD FRML MDRD: 72 ML/MIN/1.73M2
GLUCOSE SERPL-MCNC: 101 MG/DL (ref 70–99)
POTASSIUM SERPL-SCNC: 3.6 MMOL/L (ref 3.4–5.3)
SODIUM SERPL-SCNC: 142 MMOL/L (ref 136–145)
TSH SERPL DL<=0.005 MIU/L-ACNC: 0.96 UIU/ML (ref 0.3–4.2)

## 2023-05-20 ENCOUNTER — HEALTH MAINTENANCE LETTER (OUTPATIENT)
Age: 48
End: 2023-05-20

## 2023-07-19 DIAGNOSIS — I10 BENIGN ESSENTIAL HYPERTENSION: ICD-10-CM

## 2023-07-19 RX ORDER — LISINOPRIL AND HYDROCHLOROTHIAZIDE 20; 25 MG/1; MG/1
1 TABLET ORAL DAILY
Qty: 90 TABLET | Refills: 3 | OUTPATIENT
Start: 2023-07-19

## 2023-07-19 NOTE — TELEPHONE ENCOUNTER
Pending Prescriptions:                       Disp   Refills    lisinopril-hydrochlorothiazide (ZESTORETI*90 tab*3            Sig: Take 1 tablet by mouth daily    Misplaced current prescription so is requesting another get sent to the pharmacy.

## 2023-08-07 ASSESSMENT — SLEEP AND FATIGUE QUESTIONNAIRES
HOW LIKELY ARE YOU TO NOD OFF OR FALL ASLEEP WHEN YOU ARE A PASSENGER IN A CAR FOR AN HOUR WITHOUT A BREAK: WOULD NEVER DOZE
HOW LIKELY ARE YOU TO NOD OFF OR FALL ASLEEP WHILE SITTING AND TALKING TO SOMEONE: WOULD NEVER DOZE
HOW LIKELY ARE YOU TO NOD OFF OR FALL ASLEEP IN A CAR, WHILE STOPPED FOR A FEW MINUTES IN TRAFFIC: WOULD NEVER DOZE
HOW LIKELY ARE YOU TO NOD OFF OR FALL ASLEEP WHILE SITTING AND READING: HIGH CHANCE OF DOZING
HOW LIKELY ARE YOU TO NOD OFF OR FALL ASLEEP WHILE SITTING INACTIVE IN A PUBLIC PLACE: SLIGHT CHANCE OF DOZING
HOW LIKELY ARE YOU TO NOD OFF OR FALL ASLEEP WHILE SITTING QUIETLY AFTER LUNCH WITHOUT ALCOHOL: SLIGHT CHANCE OF DOZING
HOW LIKELY ARE YOU TO NOD OFF OR FALL ASLEEP WHILE LYING DOWN TO REST IN THE AFTERNOON WHEN CIRCUMSTANCES PERMIT: HIGH CHANCE OF DOZING
HOW LIKELY ARE YOU TO NOD OFF OR FALL ASLEEP WHILE WATCHING TV: HIGH CHANCE OF DOZING

## 2023-08-08 ENCOUNTER — VIRTUAL VISIT (OUTPATIENT)
Dept: SLEEP MEDICINE | Facility: CLINIC | Age: 48
End: 2023-08-08
Attending: FAMILY MEDICINE
Payer: COMMERCIAL

## 2023-08-08 VITALS — HEIGHT: 71 IN | WEIGHT: 289 LBS | BODY MASS INDEX: 40.46 KG/M2

## 2023-08-08 DIAGNOSIS — R06.83 SNORING: ICD-10-CM

## 2023-08-08 DIAGNOSIS — G47.00 INSOMNIA, UNSPECIFIED TYPE: ICD-10-CM

## 2023-08-08 DIAGNOSIS — I10 ESSENTIAL HYPERTENSION: ICD-10-CM

## 2023-08-08 DIAGNOSIS — R06.81 WITNESSED APNEIC SPELLS: Primary | ICD-10-CM

## 2023-08-08 DIAGNOSIS — G47.19 EXCESSIVE DAYTIME SLEEPINESS: ICD-10-CM

## 2023-08-08 DIAGNOSIS — E66.01 MORBID OBESITY (H): ICD-10-CM

## 2023-08-08 PROCEDURE — 99204 OFFICE O/P NEW MOD 45 MIN: CPT | Mod: VID | Performed by: PHYSICIAN ASSISTANT

## 2023-08-08 ASSESSMENT — PAIN SCALES - GENERAL: PAINLEVEL: NO PAIN (0)

## 2023-08-08 NOTE — PROGRESS NOTES
Virtual Visit Details    Type of service:  Video Visit   Video visit start time 3:06PM  Video visit end time 3:37PM  Originating Location (pt. Location): Parked in car    Distant Location (provider location):  Off-site  Platform used for Video Visit: [facility]    Outpatient Sleep Medicine Consultation:    Name: Juanjo Espinoza MRN# [id number]   Age: 48 year old YOB: 1975     Date of Consultation: August 8, 2023  Consultation is requested by: Randolph Pop MD  [address] [facility] [address] [facility] [address]  [place] [address] Randolph Pop  Primary care provider: Alecia Ceja       Reason for Sleep Consult:     Juanjo Espinoza is sent by Randolph Pop for a sleep consultation regarding concern for ARCHIE.    Patient s Reason for visit  Juanjo Espinoza main reason for visit: Trouble staying asleep  Patient states problem(s) started: Couple years ago  Juanjo Espinoza's goals for this visit: Not sure           Assessment and Plan:     Summary Sleep Diagnoses:  1. Witnessed apneic spells  2. Snoring  3. Insomnia, unspecified type  4. Excessive daytime sleepiness  Comorbid Diagnoses:  5. Morbid obesity (H)  6. Essential hypertension    Patient is being evaluated for Obstructive Sleep Apnea (ARCHIE).  Patient's risk factors for ARCHIE include: snoring, excessive daytime sleepiness (ESS 11, mild), witnessed apneas, gasp/choking arousals, frequent nocturia/sleep maintenance insomnia, obesity (BMI 40.3), high blood pressure, and male gender. We discussed pathophysiology of ARCHIE and consequences of untreated sleep apnea. Patient is interested in treatment and willing to undergo overnight sleep testing. Discussed testing with overnight attended polysomnography versus home sleep apnea testing. HST preferred by patient and insurance and has been ordered today, HST appropriate given lack of significant comorbidity. Briefly discussed CPAP in the likely event ARCHIE is identified on test and patient is very open to this. Will plan to see him back 1-2  "weeks after HST for formal results discussion but if follow-up is booked far out he is also comfortable with MyChart message of results and empiric autoCPAP to help expedite set up. Education materials provided in AVS.          History of Present Illness:     Juanjo Espinoza is a 48-year-old male with obesity, hypertension, who presents to clinic for evaluation of suspected sleep apnea.     SLEEP-WAKE SCHEDULE:     Work/School Days: Patient goes to school/work: Yes -    Usually gets into bed at 10pm  Takes patient about 10 min to fall asleep  Has trouble falling asleep 2 nights a week   Wakes up in the middle of the night 2 to 3 times   Wakes up due to Use the bathroom  He has trouble falling back asleep 2 times a week   It usually takes 10 min to get back to sleep  Patient is usually up at 6 am  Uses alarm: Yes    Weekends/Non-work Days/All Other Days:  Usually gets into bed at 10 pm   Takes patient about 10 min to fall asleep  Patient is usually up at 6 am  Uses alarm: Yes    Sleep Need  Patient estimates he gets 5 hours sleep on average   Patient thinks he needs about 8 hours sleep    Juanjo Espinoza prefers to sleep in this position(s): Side   Patient states they do the following activities in bed: Read;Watch TV;Use phone, computer, or tablet    Naps  Patient takes a purposeful nap Never   He feels better after a nap: Yes  He dozes off unintentionally 4 days per week after work, can be 1-2 hours at times, \"I go upstairs to change after work and might fall asleep on the bed after\"  Patient has had a driving accident or near-miss due to sleepiness/drowsiness: No      SLEEP DISRUPTIONS:    Breathing/Snoring  Patient snores:Yes  Other people complain about his snoring: Yes  Patient has been told he stops breathing in his sleep:Yes   Gasp/choking arousals \"several times\" over past few years   He has issues with the following: Morning mouth dryness;Stuffy nose when you wake up;Getting up to " "urinate more than once    Movement:  Patient gets pain, discomfort, with an urge to move:  Yes - \"I get this urge like just in my legs like I need to flex the muscles in my legs sometimes at night and I can't lay down I have to do something like walking so I pace the kitchen\". Occurs once or twice a month   It happens when he is resting:  Yes  It happens more at night:  Yes  Patient has been told he kicks his legs at night:  No     Behaviors in Sleep:  Denies sleepwalking, sleep talking, sleep eating, bruxism, dream enactment, recurring nightmares, night terrors, sleep paralysis, hypnagogic/hypnopompic hallucinations, cataplexy      CAFFEINE AND OTHER SUBSTANCES:    Patient consumes caffeinated beverages per day:  1  Last caffeine use is usually: 8 am  List of any prescribed or over the counter stimulants that patient takes:  None  List of any prescribed or over the counter sleep medication patient takes: None  Patient drinks alcohol to help them sleep: No  Patient drinks alcohol near bedtime: No    Family History:  Patient has a family member been diagnosed with a sleep disorder: 2 cousins with ARCHIE on CPAP      SCALES:    EPWORTH SLEEPINESS SCALE         8/7/2023     3:02 PM    Maud Sleepiness Scale ( RUPALI Oconnor  [id number]<br>ESS - [place]/English - Final version - 21 Nov 07 - [facility] Research [facility].)   Sitting and reading High chance of dozing   Watching TV High chance of dozing   Sitting, inactive in a public place (e.g. a theatre or a meeting) Slight chance of dozing   As a passenger in a car for an hour without a break Would never doze   Lying down to rest in the afternoon when circumstances permit High chance of dozing   Sitting and talking to someone Would never doze   Sitting quietly after a lunch without alcohol Slight chance of dozing   In a car, while stopped for a few minutes in traffic Would never doze   Maud Score (MC) 11   Maud Score (Sleep) 11         INSOMNIA SEVERITY INDEX (MARYELLEN)          " 8/7/2023     2:49 PM   Insomnia Severity Index (MARYELLEN)   Difficulty falling asleep 2   Difficulty staying asleep 3   Problems waking up too early 3   How SATISFIED/DISSATISFIED are you with your CURRENT sleep pattern? 4   How NOTICEABLE to others do you think your sleep problem is in terms of impairing the quality of your life? 3   How WORRIED/DISTRESSED are you about your current sleep problem? 3   To what extent do you consider your sleep problem to INTERFERE with your daily functioning (e.g. daytime fatigue, mood, ability to function at work/daily chores, concentration, memory, mood, etc.) CURRENTLY? 3   MARYELLEN Total Score 21       Guidelines for Scoring/Interpretation:  Total score categories:  0-7 = No clinically significant insomnia   8-14 = Subthreshold insomnia   15-21 = Clinical insomnia (moderate severity)  22-28 = Clinical insomnia (severe)  Used via courtesy of www.[employer].va.gov with permission from Kristian Tran PhD., [facility]    Allergies:    No Known Allergies    Medications:    Current Outpatient Medications   Medication Sig Dispense Refill    ibuprofen (ADVIL,MOTRIN) 200 MG tablet [IBUPROFEN (ADVIL,MOTRIN) 200 MG TABLET] Take 200 mg by mouth every 6 (six) hours as needed for pain.      lisinopril-hydrochlorothiazide (ZESTORETIC) 20-25 MG tablet Take 1 tablet by mouth daily 90 tablet 3       Problem List:  Patient Active Problem List    Diagnosis Date Noted    Morbid obesity (H) 04/11/2023     Priority: Medium    Acute right-sided low back pain without sciatica 05/16/2018     Priority: Medium    Essential hypertension 05/03/2018     Priority: Medium    Psoriasis      Priority: Medium     Created by Conversion            Past Medical/Surgical History:  No past medical history on file.  Past Surgical History:   Procedure Laterality Date    [facility] APPENDECTOMY      Description: Appendectomy;  Recorded: 02/22/2013;       Social History:  Social History     Socioeconomic History    Marital status:  "Single     Spouse name: Not on file    Number of children: Not on file    Years of education: Not on file    Highest education level: Not on file   Occupational History    Not on file   Tobacco Use    Smoking status: Every Day     Packs/day: 0.50     Types: Cigarettes    Smokeless tobacco: Never   Substance and Sexual Activity    Alcohol use: Not on file    Drug use: Not on file    Sexual activity: Not on file   Other Topics Concern    Not on file   Social History Narrative    Not on file     Social Determinants of Health     Financial Resource Strain: Not on file   Food Insecurity: Not on file   Transportation Needs: Not on file   Physical Activity: Not on file   Stress: Not on file   Social Connections: Not on file   Intimate Partner Violence: Not on file   Housing Stability: Not on file       Family History:  No family history on file.    Review of Systems:  In the last TWO WEEKS have you experienced any of the following symptoms?  Fevers: No  Night Sweats: Yes  Weight Gain: Yes  Pain at Night: No  Double Vision: No  Changes in Vision: No  Difficulty Breathing through Nose: No  Sore Throat in Morning: No  Dry Mouth in the Morning: Yes  Shortness of Breath Lying Flat: No  Shortness of Breath With Activity: No  Awakening with Shortness of Breath: No  Increased Cough: No  Heart Racing at Night: No  Swelling in Feet or Legs: Yes  Diarrhea at Night: No  Heartburn at Night: No  Urinating More than Once at Night: Yes  Losing Control of Urine at Night: No  Joint Pains at Night: No  Headaches in Morning: No  Weakness in Arms or Legs: No  Depressed Mood: Yes  Anxiety: Yes     Physical Examination:  Vitals: Ht 1.803 m (5' 11\")   Wt 131.1 kg (289 lb)   BMI 40.31 kg/m    BMI= Body mass index is 40.31 kg/m .  General appearance: Awake, alert, cooperative. Well groomed. In no apparent distress.  HEENT: Head: Normocephalic, atraumatic. Eyes:Conjunctiva clear. Sclera normal. Nose: External appearance without deformity. "   Pulmonary:  Able to speak easily in full sentences. No cough or wheeze.   Skin:  No rashes or significant lesions on visible skin.   Neurologic: Alert, oriented x3.   Psychiatric: Mood euthymic. Affect congruent with full range and intensity.         Data: All pertinent previous laboratory data reviewed     Recent Labs   Lab Test 04/28/23  1621 04/11/23  1049    142   POTASSIUM 3.6 3.8   CHLORIDE 102 105   CO2 28 23   ANIONGAP 12 14   * 103*   BUN 20.3* 10.1   CR 1.26* 1.04   MARCELO 10.1* 9.3       Recent Labs   Lab Test 04/11/23  1049   WBC 8.1   RBC 5.35   HGB 15.2   HCT 46.1   MCV 86   MCH 28.4   MCHC 33.0   RDW 12.9          Recent Labs   Lab Test 04/11/23  1049   PROTTOTAL 7.5   ALBUMIN 4.5   BILITOTAL 0.3   ALKPHOS 77   AST 17   ALT 11       TSH (uIU/mL)   Date Value   04/28/2023 0.96       No results found for: UAMP, UBARB, BENZODIAZEUR, UCANN, UCOC, OPIT, UPCP    No results found for: IRONSAT, [id number], BRANDON    No results found for: PH, PHARTERIAL, PO2, [id number], SAT, PCO2, HCO3, BASEEXCESS, DYLAN, BEB    @LABRCNTIPR(phv:4,pco2v:4,po2v:4,hco3v:4,monique:4,o2per:4)@    Echocardiology: No results found for this or any previous visit (from the past 4320 hour(s)).    Chest x-ray: No results found for this or any previous visit from the past 365 days.      Chest CT: No results found for this or any previous visit from the past 365 days.      PFT: Most Recent [facility] Pulmonary Function Testing    No results found for: 20001  No results found for: 20002  No results found for: 20003  No results found for: 20015  No results found for: 20016  No results found for: 20027  No results found for: 20028  No results found for: 20029  No results found for: 20079  No results found for: 20080  No results found for: 20081  No results found for: 20335  No results found for: 20105  No results found for: 20053  No results found for: 20054  No results found for: 20055      Oma Ball PA-C 8/8/2023     [facility]  [facility] [place]  [address]  [address], [place] [address]     [facility] [place]  [address], [place] [address]    Chart documentation was completed, in part, with [employer] voice-recognition software. Even though reviewed, some grammatical, spelling, and word errors may remain.    55 minutes spent on day of encounter reviewing medical records, history and physical examination, review of previous testing and interpretation, documentation and further activities as noted above

## 2023-08-08 NOTE — PATIENT INSTRUCTIONS
"          MY TREATMENT INFORMATION FOR SLEEP APNEA-  Juanjo Espinoza  Am I having a home sleep study?  --->Watch the video for the device you are using:    -/drop off device-   https://www.you"[employer]".com/watch?v=yGGFBdELGhk  Frequently asked questions:  1. What is Obstructive Sleep Apnea (ARCHIE)? ARCHIE is the most common type of sleep apnea. Apnea means, \"without breath.\"  Apnea is most often caused by narrowing or collapse of the upper airway as muscles relax during sleep.   Almost everyone has occasional apneas. Most people with sleep apnea have had brief interruptions at night frequently for many years.  The severity of sleep apnea is related to how frequent and severe the events are.   2. What are the consequences of ARCHIE? Symptoms include: feeling sleepy during the day, snoring loudly, gasping or stopping of breathing, trouble sleeping, and occasionally morning headaches or heartburn at night.  Sleepiness can be serious and even increase the risk of falling asleep while driving. Other health consequences may include development of high blood pressure and other cardiovascular disease in persons who are susceptible. Untreated ARCHIE  can contribute to heart disease, stroke and diabetes.   3. What are the treatment options? In most situations, sleep apnea is a lifelong disease that must be managed with daily therapy. Medications are not effective for sleep apnea and surgery is generally not considered until other therapies have been tried. Your treatment is your choice . Continuous Positive Airway (CPAP) works right away and is the therapy that is effective in nearly everyone. An oral device to hold your jaw forward is usually the next most reliable option. Other options include postioning devices (to keep you off your back), weight loss, and surgery including a tongue pacing device. There is more detail about some of these options below.  4. Are my sleep studies covered by insurance? Although we will request " verification of coverage, we advise you also check in advance of the study to ensure there is coverage.    Important tips for those choosing CPAP and similar devices  For new devices, sign up for device TAMIR to monitor your device for your followup visits  We encourage you to utilize the [employer] tamir or website (myAir web (resmed.com) ) to monitor your therapy progress and share the data with your healthcare team when you discuss your sleep apnea.                                                    Know your equipment:  CPAP is continuous positive airway pressure that prevents obstructive sleep apnea by keeping the throat from collapsing while you are sleeping. In most cases, the device is  smart  and can slowly self-adjusts if your throat collapses and keeps a record every day of how well you are treated-this information is available to you and your care team.  BPAP is bilevel positive airway pressure that keeps your throat open and also assists each breath with a pressure boost to maintain adequate breathing.  Special kinds of BPAP are used in patients who have inadequate breathing from lung or heart disease. In most cases, the device is  smart  and can slowly self-adjusts to assist breathing. Like CPAP, the device keeps a record of how well you are treated.  Your mask is your connection to the device. You get to choose what feels most comfortable and the staff will help to make sure if fits. Here: are some examples of the different masks that are available:       Key points to remember on your journey with sleep apnea:  Sleep study.  PAP devices often need to be adjusted during a sleep study to show that they are effective and adjusted right.  Good tips to remember: Try wearing just the mask during a quiet time during the day so your body adapts to wearing it. A humidifier is recommended for comfort in most cases to prevent drying of your nose and throat. Allergy medication from your provider may help you if  you are having nasal congestion.  Getting settled-in. It takes more than one night for most of us to get used to wearing a mask. Try wearing just the mask during a quiet time during the day so your body adapts to wearing it. A humidifier is recommended for comfort in most cases. Our team will work with you carefully on the first day and will be in contact within 4 days and again at 2 and 4 weeks for advice and remote device adjustments. Your therapy is evaluated by the device each day.   Use it every night. The more you are able to sleep naturally for 7-8 hours, the more likely you will have good sleep and to prevent health risks or symptoms from sleep apnea. Even if you use it 4 hours it helps. Occasionally all of us are unable to use a medical therapy, in sleep apnea, it is not dangerous to miss one night.   Communicate. Call our skilled team on the number provided on the first day if your visit for problems that make it difficult to wear the device. Over 2 out of 3 patients can learn to wear the device long-term with help from our team. Remember to call our team or your sleep providers if you are unable to wear the device as we may have other solutions for those who cannot adapt to mask CPAP therapy. It is recommended that you sleep your sleep provider within the first 3 months and yearly after that if you are not having problems.   Use it for your health. We encourage use of CPAP masks during daytime quiet periods to allow your face and brain to adapt to the sensation of CPAP so that it will be a more natural sensation to awaken to at night or during naps. This can be very useful during the first few weeks or months of adapting to CPAP though it does not help medically to wear CPAP during wakefulness and  should not be used as a strategy just to meet guidelines.  Take care of your equipment. Make sure you clean your mask and tubing using directions every day and that your filter and mask are replaced as  recommended or if they are not working.     BESIDES CPAP, WHAT OTHER THERAPIES ARE THERE?    Positioning Device  Positioning devices are generally used when sleep apnea is mild and only occurs on your back.This example shows a pillow that straps around the waist. It may be appropriate for those whose sleep study shows milder sleep apnea that occurs primarily when lying flat on one's back. Preliminary studies have shown benefit but effectiveness at home may need to be verified by a home sleep test. These devices are generally not covered by medical insurance.  Examples of devices that maintain sleeping on the back to prevent snoring and mild sleep apnea.    Belt type body positioner  http://Tablefinder/    Electronic reminder  http://nightshifttherapy.com/            Oral Appliance  What is oral appliance therapy?  An oral appliance device fits on your teeth at night like a retainer used after having braces. The device is made by a specialized dentist and requires several visits over 1-2 months before a manufactured device is made to fit your teeth and is adjusted to prevent your sleep apnea. Once an oral device is working properly, snoring should be improved. A home sleep test may be recommended at that time if to determine whether the sleep apnea is adequately treated.       Some things to remember:  -Oral devices are often, but not always, covered by your medical insurance. Be sure to check with your insurance provider.   -If you are referred for oral therapy, you will be given a list of specialized dentists to consider or you may choose to visit the Web site of the American Academy of Dental Sleep Medicine  -Oral devices are less likely to work if you have severe sleep apnea or are extremely overweight.     More detailed information  An oral appliance is a small acrylic device that fits over the upper and lower teeth  (similar to a retainer or a mouth guard). This device slightly moves jaw forward, which moves  the base of the tongue forward, opens the airway, improves breathing for effective treat snoring and obstructive sleep apnea in perhaps 7 out of 10 people .  The best working devices are custom-made by a dental device  after a mold is made of the teeth 1, 2, 3.  When is an oral appliance indicated?  Oral appliance therapy is recommended as a first-line treatment for patients with primary snoring, mild sleep apnea, and for patients with moderate sleep apnea who prefer appliance therapy to use of CPAP4, 5. Severity of sleep apnea is determined by sleep testing and is based on the number of respiratory events per hour of sleep.   How successful is oral appliance therapy?  The success rate of oral appliance therapy in patients with mild sleep apnea is 75-80% while in patients with moderate sleep apnea it is 50-70%. The chance of success in patients with severe sleep apnea is 40-50%. The research also shows that oral appliances have a beneficial effect on the cardiovascular health of ARCHIE patients at the same magnitude as CPAP therapy7.  Oral appliances should be a second-line treatment in cases of severe sleep apnea, but if not completely successful then a combination therapy utilizing CPAP plus oral appliance therapy may be effective. Oral appliances tend to be effective in a broad range of patients although studies show that the patients who have the highest success are females, younger patients, those with milder disease, and less severe obesity. 3, 6.   Finding a dentist that practices dental sleep medicine  Specific training is available through the American Academy of Dental Sleep Medicine for dentists interested in working in the field of sleep. To find a dentist who is educated in the field of sleep and the use of oral appliances, near you, visit the Web site of the American Academy of Dental Sleep Medicine.    References  1. dolores Markham al. Objectively measured vs self-reported compliance during  oral appliance therapy for sleep-disordered breathing. Chest 2013; 144(5): 8930-3123.  2. Willi, et al. Objective measurement of compliance during oral appliance therapy for sleep-disordered breathing. Thorax 2013; 68(1): 91-96.  3. Jose De Jesus et al. Mandibular advancement devices in 620 men and women with ARCHIE and snoring: tolerability and predictors of treatment success. Chest 2004; 125: 6903-7624.  4. Petra, et al. Oral appliances for snoring and ARCHIE: a review. Sleep 2006; 29: 244-262.  5. Neisha et al. Oral appliance treatment for ARCHIE: an update. J Clin Sleep Med 2014; 10(2): 215-227.  6. Maria De Jesus et al. Predictors of OSAH treatment outcome. J Dent Res 2007; 86: 1203-0711.      Weight Loss:    Weight loss is a long-term strategy that may improve sleep apnea in some patients.    Weight management is a personal decision and the decision should be based on your interest and the potential benefits.  If you are interested in exploring weight loss strategies, the following discussion covers the impact on weight loss on sleep apnea and the approaches that may be successful.    Being overweight does not necessarily mean you will have health consequences.  Those who have BMI over 35 or over 27 with existing medical conditions carries greater risk.   Weight loss decreases severity of sleep apnea in most people with obesity. For those with mild obesity who have developed snoring with weight gain, even 15-30 pound weight loss can improve and occasionally eliminate sleep apnea.  Structured and life-long dietary and health habits are necessary to lose weight and keep healthier weight levels.     Though there may be significant health benefits from weight loss, long-term weight loss is very difficult to achieve- studies show success with dietary management in less than 10% of people. In addition, substantial weight loss may require years of dietary control and may be difficult if patients have severe obesity. In  these cases, surgical management may be considered.  Finally, older individuals who have tolerated obesity without health complications may be less likely to benefit from weight loss strategies.      [unfilled]    Surgery:    Surgery for obstructive sleep apnea is considered generally only when other therapies fail to work. Surgery may be discussed with you if you are having a difficult time tolerating CPAP and or when there is an abnormal structure that requires surgical correction.  Nose and throat surgeries often enlarge the airway to prevent collapse.  Most of these surgeries create pain for 1-2 weeks and up to half of the most common surgeries are not effective throughout life.  You should carefully discuss the benefits and drawbacks to surgery with your sleep provider and surgeon to determine if it is the best solution for you.   More information  Surgery for ARCHIE is directed at areas that are responsible for narrowing or complete obstruction of the airway during sleep.  There are a wide range of procedures available to enlarge and/or stabilize the airway to prevent blockage of breathing in the three major areas where it can occur: the palate, tongue, and nasal regions.  Successful surgical treatment depends on the accurate identification of the factors responsible for obstructive sleep apnea in each person.  A personalized approach is required because there is no single treatment that works well for everyone.  Because of anatomic variation, consultation with an examination by a sleep surgeon is a critical first step in determining what surgical options are best for each patient.  In some cases, examination during sedation may be recommended in order to guide the selection of procedures.  Patients will be counseled about risks and benefits as well as the typical recovery course after surgery. Surgery is typically not a cure for a person s ARCHIE.  However, surgery will often significantly improve one s ARCHIE  severity (termed  success rate ).  Even in the absence of a cure, surgery will decrease the cardiovascular risk associated with OSA7; improve overall quality of life8 (sleepiness, functionality, sleep quality, etc).      Palate Procedures:  Patients with ARCHIE often have narrowing of their airway in the region of their tonsils and uvula.  The goals of palate procedures are to widen the airway in this region as well as to help the tissues resist collapse.  Modern palate procedure techniques focus on tissue conservation and soft tissue rearrangement, rather than tissue removal.  Often the uvula is preserved in this procedure. Residual sleep apnea is common in patient after pharyngoplasty with an average reduction in sleep apnea events of 33%2.      Tongue Procedures:  ExamWhile patients are awake, the muscles that surround the throat are active and keep this region open for breathing. These muscles relax during sleep, allowing the tongue and other structures to collapse and block breathing.  There are several different tongue procedures available.  Selection of a tongue base procedure depends on characteristics seen on physical exam.  Generally, procedures are aimed at removing bulky tissues in this area or preventing the back of the tongue from falling back during sleep.  Success rates for tongue surgery range from 50-62%3.    Hypoglossal Nerve Stimulation:  Hypoglossal nerve stimulation has recently received approval from the [place] Food and Drug Administration for the treatment of obstructive sleep apnea.  This is based on research showing that the system was safe and effective in treating sleep apnea6.  Results showed that the median AHI score decreased 68%, from 29.3 to 9.0. This therapy uses an implant system that senses breathing patterns and delivers mild stimulation to airway muscles, which keeps the airway open during sleep.  The system consists of three fully implanted components: a small generator  (similar in size to a pacemaker), a breathing sensor, and a stimulation lead.  Using a small handheld remote, a patient turns the therapy on before bed and off upon awakening.    Candidates for this device must be greater than 18 years of age, have moderate to severe ARCHIE (AHI between 15-65), BMI less than 35, have tried CPAP/oral appliance for at least 8 weeks without success, and have appropriate upper airway anatomy (determined by a sleep endoscopy performed by Dr. Hansel Romeo).    Hypoglossal Nerve Stimulation Pathway:    The sleep surgeon s office will work with the patient through the insurance prior-authorization process (including communications and appeals).    Nasal Procedures:  Nasal obstruction can interfere with nasal breathing during the day and night.  Studies have shown that relief of nasal obstruction can improve the ability of some patients to tolerate positive airway pressure therapy for obstructive sleep apnea1.  Treatment options include medications such as nasal saline, topical corticosteroid and antihistamine sprays, and oral medications such as antihistamines or decongestants. Non-surgical treatments can include external nasal dilators for selected patients. If these are not successful by themselves, surgery can improve the nasal airway either alone or in combination with these other options.      Combination Procedures:  Combination of surgical procedures and other treatments may be recommended, particularly if patients have more than one area of narrowing or persistent positional disease.  The success rate of combination surgery ranges from 66-80%2,3.    References  Todd KAUFMAN. The Role of the Nose in Snoring and Obstructive Sleep Apnoea: An Update.  Eur Arch Otorhinolaryngol. 2011; 268: 1365-73.   Harper SM; Crow JA; Katlyn JR; Pallanch JF; David CHADWICK; Ca REGAN; Naldo LANCE. Surgical modifications of the upper airway for obstructive sleep apnea in adults: a systematic review and  meta-analysis. SLEEP 2010;33(10):3317-0700. Edmund SIMMONS. Hypopharyngeal surgery in obstructive sleep apnea: an evidence-based medicine review.  Arch Otolaryngol Head Neck Surg. 2006 Feb;132(2):206-13.  Yunior YAARTI, Geronimo Y, German SVETLANA. The efficacy of anatomically based multilevel surgery for obstructive sleep apnea. Otolaryngol Head Neck Surg. 2003 Oct;129(4):327-35.  Kezirian E, Goldberg A. Hypopharyngeal Surgery in Obstructive Sleep Apnea: An Evidence-Based Medicine Review. Arch Otolaryngol Head Neck Surg. 2006 Feb;132(2):206-13.  Emelia PJ et al. Upper-Airway Stimulation for Obstructive Sleep Apnea.  N Engl J Med. 2014 Jan 9;370(2):139-49.  Ava Y et al. Increased Incidence of Cardiovascular Disease in Middle-aged Men with Obstructive Sleep Apnea. Am J Respir Crit Care Med; 2002 166: 159-165  Stewart EM et al. Studying Life Effects and Effectiveness of Palatopharyngoplasty (SLEEP) study: Subjective Outcomes of Isolated Uvulopalatopharyngoplasty. Otolaryngol Head Neck Surg. 2011; 144: 623-631.        WHAT IF I ONLY HAVE SNORING?    Mandibular advancement devices, lateral sleep positioning, long-term weight loss and treatment of nasal allergies have been shown to improve snoring.  Exercising tongue muscles with a game (https://apps.[employer].Novogenie/us/tamir/soundly-reduce-snoring/wb8165138585) or stimulating the tongue during the day with a device (https://doi.org/10.3390/sll26059930) have improved snoring in some individuals.    Remember to Drive Safe... Drive Alive     Sleep health profoundly affects your health, mood, and your safety.  Thirty three percent of the population (one in three of us) is not getting enough sleep and many have a sleep disorder. Not getting enough sleep or having an untreated / undertreated sleep condition may make us sleepy without even knowing it. In fact, our driving could be dramatically impaired due to our sleep health. As your provider, here are some things I would like you to know about  driving:     Here are some warning signs for impairment and dangerous drowsy driving:              -Having been awake more than 16 hours               -Looking tired               -Eyelid drooping              -Head nodding (it could be too late at this point)              -Driving for more than 30 minutes     Some things you could do to make the driving safer if you are experiencing some drowsiness:              -Stop driving and rest              -Call for transportation              -Make sure your sleep disorder is adequately treated     Some things that have been shown NOT to work when experiencing drowsiness while driving:              -Turning on the radio              -Opening windows              -Eating any  distracting  /  entertaining  foods (e.g., sunflower seeds, candy, or any other)              -Talking on the phone      Your decision may not only impact your life, but also the life of others. Please, remember to drive safe for yourself and all of us

## 2023-08-08 NOTE — NURSING NOTE
Is the patient currently in the state of [place]? YES    Visit mode:VIDEO    If the visit is dropped, the patient can be reconnected by: VIDEO VISIT: Text to cell phone: [phone]    Will anyone else be joining the visit? NO      How would you like to obtain your AVS? MyChart    Are changes needed to the allergy or medication list? NO    Reason for visit: Consult      Has patient had flu shot for current/most recent flu season? If so, when? No    Malena BE

## 2023-08-31 ENCOUNTER — TRANSFERRED RECORDS (OUTPATIENT)
Dept: HEALTH INFORMATION MANAGEMENT | Facility: CLINIC | Age: 48
End: 2023-08-31
Payer: COMMERCIAL

## 2023-09-05 DIAGNOSIS — I10 BENIGN ESSENTIAL HYPERTENSION: ICD-10-CM

## 2023-09-05 NOTE — TELEPHONE ENCOUNTER
Pending Prescriptions:                       Disp   Refills    lisinopril-hydrochlorothiazide (ZESTORETI*90 tab*3            Sig: Take 1 tablet by mouth daily

## 2023-09-06 ENCOUNTER — TRANSFERRED RECORDS (OUTPATIENT)
Dept: HEALTH INFORMATION MANAGEMENT | Facility: CLINIC | Age: 48
End: 2023-09-06
Payer: COMMERCIAL

## 2023-09-06 RX ORDER — LISINOPRIL AND HYDROCHLOROTHIAZIDE 20; 25 MG/1; MG/1
1 TABLET ORAL DAILY
Qty: 90 TABLET | Refills: 1 | Status: SHIPPED | OUTPATIENT
Start: 2023-09-06 | End: 2024-04-16

## 2023-09-06 NOTE — TELEPHONE ENCOUNTER
"Routing refill request to provider for review/approval because:  Labs out of range:  CR  Failed blood pressure  Too early to refill    Last Written Prescription Date:  4/28/23  Last Fill Quantity: 90,  # refills: 3   Last office visit provider:  4/28/23.     Requested Prescriptions   Pending Prescriptions Disp Refills    lisinopril-hydrochlorothiazide (ZESTORETIC) 20-25 MG tablet 90 tablet 3     Sig: Take 1 tablet by mouth daily       Diuretics (Including Combos) Protocol Failed - 9/6/2023  1:30 PM        Failed - Blood pressure under 140/90 in past 12 months     BP Readings from Last 3 Encounters:   04/28/23 (!) 152/102   04/11/23 (!) 175/117   04/15/21 (!) 128/94                 Failed - Normal serum creatinine on file in past 12 months     Recent Labs   Lab Test 04/28/23  1621   CR 1.26*              Passed - Recent (12 mo) or future (30 days) visit within the authorizing provider's specialty     Patient has had an office visit with the authorizing provider or a provider within the authorizing providers department within the previous 12 mos or has a future within next 30 days. See \"Patient Info\" tab in inbasket, or \"Choose Columns\" in Meds & Orders section of the refill encounter.              Passed - Medication is active on med list        Passed - Patient is age 18 or older        Passed - Normal serum potassium on file in past 12 months     Recent Labs   Lab Test 04/28/23  1621   POTASSIUM 3.6                    Passed - Normal serum sodium on file in past 12 months     Recent Labs   Lab Test 04/28/23  1621                ACE Inhibitors (Including Combos) Protocol Failed - 9/6/2023  1:30 PM        Failed - Blood pressure under 140/90 in past 12 months     BP Readings from Last 3 Encounters:   04/28/23 (!) 152/102   04/11/23 (!) 175/117   04/15/21 (!) 128/94                 Failed - Normal serum creatinine on file in past 12 months     Recent Labs   Lab Test 04/28/23  1621   CR 1.26*       Ok to refill " "medication if creatinine is low          Passed - Recent (12 mo) or future (30 days) visit within the authorizing provider's specialty     Patient has had an office visit with the authorizing provider or a provider within the authorizing providers department within the previous 12 mos or has a future within next 30 days. See \"Patient Info\" tab in inbasket, or \"Choose Columns\" in Meds & Orders section of the refill encounter.              Passed - Medication is active on med list        Passed - Patient is age 18 or older        Passed - Normal serum potassium on file in past 12 months     Recent Labs   Lab Test 04/28/23  1621   POTASSIUM 3.6                  Arline Allen, RN 09/06/23 1:30 PM  "

## 2024-03-12 ENCOUNTER — PATIENT OUTREACH (OUTPATIENT)
Dept: CARE COORDINATION | Facility: CLINIC | Age: 49
End: 2024-03-12
Payer: COMMERCIAL

## 2024-03-26 ENCOUNTER — PATIENT OUTREACH (OUTPATIENT)
Dept: CARE COORDINATION | Facility: CLINIC | Age: 49
End: 2024-03-26
Payer: COMMERCIAL

## 2024-04-16 DIAGNOSIS — I10 BENIGN ESSENTIAL HYPERTENSION: ICD-10-CM

## 2024-04-16 RX ORDER — LISINOPRIL AND HYDROCHLOROTHIAZIDE 20; 25 MG/1; MG/1
1 TABLET ORAL DAILY
Qty: 90 TABLET | Refills: 0 | Status: SHIPPED | OUTPATIENT
Start: 2024-04-16

## 2024-05-18 ENCOUNTER — HEALTH MAINTENANCE LETTER (OUTPATIENT)
Age: 49
End: 2024-05-18

## 2024-08-23 ENCOUNTER — MYC MEDICAL ADVICE (OUTPATIENT)
Dept: FAMILY MEDICINE | Facility: CLINIC | Age: 49
End: 2024-08-23
Payer: COMMERCIAL

## 2024-08-23 NOTE — TELEPHONE ENCOUNTER
Patient Quality Outreach    Patient is due for the following:   Hypertension -  BP check    Next Steps:   Patient needs to check their blood pressure and send blood pressure readings into the clinic for a provider to review.    Type of outreach:    Sent [employer] message.      Questions for provider review:    None           Nikky Byrd RN

## 2025-06-08 ENCOUNTER — HEALTH MAINTENANCE LETTER (OUTPATIENT)
Age: 50
End: 2025-06-08